# Patient Record
Sex: MALE | Race: WHITE | Employment: FULL TIME | URBAN - METROPOLITAN AREA
[De-identification: names, ages, dates, MRNs, and addresses within clinical notes are randomized per-mention and may not be internally consistent; named-entity substitution may affect disease eponyms.]

---

## 2021-04-11 ENCOUNTER — HOSPITAL ENCOUNTER (EMERGENCY)
Facility: HOSPITAL | Age: 26
Discharge: HOME | End: 2021-04-11
Attending: EMERGENCY MEDICINE
Payer: COMMERCIAL

## 2021-04-11 VITALS
RESPIRATION RATE: 20 BRPM | SYSTOLIC BLOOD PRESSURE: 128 MMHG | WEIGHT: 230 LBS | OXYGEN SATURATION: 96 % | DIASTOLIC BLOOD PRESSURE: 86 MMHG | HEART RATE: 98 BPM | HEIGHT: 67 IN | BODY MASS INDEX: 36.1 KG/M2 | TEMPERATURE: 98 F

## 2021-04-11 DIAGNOSIS — K29.00 ACUTE GASTRITIS WITHOUT HEMORRHAGE, UNSPECIFIED GASTRITIS TYPE: Primary | ICD-10-CM

## 2021-04-11 DIAGNOSIS — E11.65 TYPE 2 DIABETES MELLITUS WITH HYPERGLYCEMIA, WITHOUT LONG-TERM CURRENT USE OF INSULIN (HCC): ICD-10-CM

## 2021-04-11 PROCEDURE — 85025 COMPLETE CBC W/AUTO DIFF WBC: CPT | Performed by: EMERGENCY MEDICINE

## 2021-04-11 PROCEDURE — 80048 BASIC METABOLIC PNL TOTAL CA: CPT | Performed by: EMERGENCY MEDICINE

## 2021-04-11 PROCEDURE — 96374 THER/PROPH/DIAG INJ IV PUSH: CPT

## 2021-04-11 PROCEDURE — 96361 HYDRATE IV INFUSION ADD-ON: CPT

## 2021-04-11 PROCEDURE — 83690 ASSAY OF LIPASE: CPT | Performed by: EMERGENCY MEDICINE

## 2021-04-11 PROCEDURE — 80076 HEPATIC FUNCTION PANEL: CPT | Performed by: EMERGENCY MEDICINE

## 2021-04-11 PROCEDURE — 99284 EMERGENCY DEPT VISIT MOD MDM: CPT

## 2021-04-11 PROCEDURE — S0028 INJECTION, FAMOTIDINE, 20 MG: HCPCS | Performed by: EMERGENCY MEDICINE

## 2021-04-11 PROCEDURE — 96375 TX/PRO/DX INJ NEW DRUG ADDON: CPT

## 2021-04-11 RX ORDER — MORPHINE SULFATE 4 MG/ML
4 INJECTION, SOLUTION INTRAMUSCULAR; INTRAVENOUS ONCE
Status: COMPLETED | OUTPATIENT
Start: 2021-04-11 | End: 2021-04-11

## 2021-04-11 RX ORDER — ONDANSETRON 2 MG/ML
4 INJECTION INTRAMUSCULAR; INTRAVENOUS ONCE
Status: COMPLETED | OUTPATIENT
Start: 2021-04-11 | End: 2021-04-11

## 2021-04-11 RX ORDER — FAMOTIDINE 10 MG/ML
20 INJECTION, SOLUTION INTRAVENOUS ONCE
Status: COMPLETED | OUTPATIENT
Start: 2021-04-11 | End: 2021-04-11

## 2021-04-11 RX ORDER — FAMOTIDINE 20 MG/1
20 TABLET ORAL 2 TIMES DAILY PRN
Qty: 30 TABLET | Refills: 0 | Status: SHIPPED | OUTPATIENT
Start: 2021-04-11 | End: 2021-05-11

## 2021-04-12 NOTE — ED QUICK NOTES
Pt states yesterday has 3 pieces of pizza and 45 minutes later developed sharp epigastric pain. States felt bloated. Pt took antacid, Kaopectate, laxative and Gas-X. States not feeling any better. States now also having pain in RLQ.  States did have stools

## 2021-04-12 NOTE — ED PROVIDER NOTES
Patient Seen in: [facility] AND [facility] Emergency Department      History   Patient presents with:  Abdomen/Flank Pain    Stated Complaint: stomach pain with radiation to the lower right side    HPI/Subjective:   HPI    68-year-old male with past medical hist clear b/l  Nose: Nose normal.   Mouth/Throat: MMM, post OP clear with no exudates  Eyes: Conjunctivae and EOM are normal. PERRLA  Neck: Normal range of motion. Neck supple. No tracheal deviation present.    CV: s1s2+, RRR, no m/r/g, normal distal pulses  Pu BLUE   RAINBOW DRAW LAVENDER   RAINBOW DRAW LIGHT GREEN   RAINBOW DRAW GOLD                   MDM      Patient's work-up is relatively unremarkable. He does have elevated blood sugar at this time.   Patient states that he is on Metformin 1000 mg twice sera

## 2021-04-12 NOTE — ED INITIAL ASSESSMENT (HPI)
Pt c/o sharp epigastric pain radiating to the RLQ x1 day. Pt reports slight diarrhea, denies any urinary symptoms.

## 2021-10-28 ENCOUNTER — OFFICE VISIT (OUTPATIENT)
Dept: FAMILY MEDICINE CLINIC | Facility: CLINIC | Age: 26
End: 2021-10-28
Payer: COMMERCIAL

## 2021-10-28 VITALS
TEMPERATURE: 98 F | HEIGHT: 67 IN | DIASTOLIC BLOOD PRESSURE: 77 MMHG | BODY MASS INDEX: 39.08 KG/M2 | SYSTOLIC BLOOD PRESSURE: 120 MMHG | WEIGHT: 249 LBS | HEART RATE: 100 BPM

## 2021-10-28 DIAGNOSIS — E66.01 SEVERE OBESITY (BMI 35.0-35.9 WITH COMORBIDITY) (HCC): ICD-10-CM

## 2021-10-28 DIAGNOSIS — E11.65 UNCONTROLLED TYPE 2 DIABETES MELLITUS WITH HYPERGLYCEMIA, WITHOUT LONG-TERM CURRENT USE OF INSULIN (HCC): Primary | ICD-10-CM

## 2021-10-28 DIAGNOSIS — E78.2 ERUPTIVE XANTHOMA: ICD-10-CM

## 2021-10-28 DIAGNOSIS — K21.9 GASTROESOPHAGEAL REFLUX DISEASE, UNSPECIFIED WHETHER ESOPHAGITIS PRESENT: ICD-10-CM

## 2021-10-28 DIAGNOSIS — L73.0 ACNE NUCHAE KELOIDALIS: ICD-10-CM

## 2021-10-28 DIAGNOSIS — Z23 NEED FOR VACCINATION: ICD-10-CM

## 2021-10-28 DIAGNOSIS — E78.2 MIXED HYPERLIPIDEMIA: ICD-10-CM

## 2021-10-28 PROCEDURE — 90471 IMMUNIZATION ADMIN: CPT | Performed by: FAMILY MEDICINE

## 2021-10-28 PROCEDURE — 3008F BODY MASS INDEX DOCD: CPT | Performed by: FAMILY MEDICINE

## 2021-10-28 PROCEDURE — 3078F DIAST BP <80 MM HG: CPT | Performed by: FAMILY MEDICINE

## 2021-10-28 PROCEDURE — 90472 IMMUNIZATION ADMIN EACH ADD: CPT | Performed by: FAMILY MEDICINE

## 2021-10-28 PROCEDURE — 90715 TDAP VACCINE 7 YRS/> IM: CPT | Performed by: FAMILY MEDICINE

## 2021-10-28 PROCEDURE — 3074F SYST BP LT 130 MM HG: CPT | Performed by: FAMILY MEDICINE

## 2021-10-28 PROCEDURE — 99204 OFFICE O/P NEW MOD 45 MIN: CPT | Performed by: FAMILY MEDICINE

## 2021-10-28 RX ORDER — INSULIN LISPRO 100 [IU]/ML
12 INJECTION, SOLUTION INTRAVENOUS; SUBCUTANEOUS
COMMUNITY
Start: 2021-05-03 | End: 2021-10-28

## 2021-10-28 RX ORDER — CHLORAL HYDRATE 500 MG
2 CAPSULE ORAL 2 TIMES DAILY WITH MEALS
COMMUNITY
Start: 2021-05-03

## 2021-10-28 RX ORDER — PANTOPRAZOLE SODIUM 40 MG/1
40 TABLET, DELAYED RELEASE ORAL
Qty: 90 TABLET | Refills: 1 | Status: SHIPPED | OUTPATIENT
Start: 2021-10-28 | End: 2022-10-28

## 2021-10-28 RX ORDER — BLOOD SUGAR DIAGNOSTIC
STRIP MISCELLANEOUS
COMMUNITY
Start: 2021-05-07

## 2021-10-28 RX ORDER — INSULIN LISPRO 100 [IU]/ML
22 INJECTION, SOLUTION INTRAVENOUS; SUBCUTANEOUS
Qty: 1 EACH | Refills: 0 | COMMUNITY
Start: 2021-10-28 | End: 2021-10-31

## 2021-10-28 RX ORDER — EZETIMIBE 10 MG/1
10 TABLET ORAL DAILY
COMMUNITY
Start: 2021-05-03 | End: 2021-10-31

## 2021-10-28 RX ORDER — PRAVASTATIN SODIUM 20 MG
20 TABLET ORAL DAILY
COMMUNITY
Start: 2021-05-03 | End: 2021-10-31

## 2021-10-28 RX ORDER — FENOFIBRATE 160 MG/1
160 TABLET ORAL DAILY
COMMUNITY
Start: 2021-05-03 | End: 2021-10-31

## 2021-10-28 RX ORDER — PEN NEEDLE, DIABETIC 31 GX5/16"
NEEDLE, DISPOSABLE MISCELLANEOUS
COMMUNITY
Start: 2021-05-07

## 2021-10-28 RX ORDER — NIACIN 500 MG
1000 TABLET ORAL NIGHTLY
COMMUNITY
Start: 2021-05-04 | End: 2021-10-31

## 2021-10-28 NOTE — PROGRESS NOTES
Patient ID: Yelena Maldonado is a 27year old male. HPI  Patient presents with:  Derm Problem: x 1 month    This is a new pt to me. Another physician was following him but he states that he never saw the patient in the office.   The patient was only being Past Medical History:   Diagnosis Date   • Diabetes [facility])    • Essential hypertension    • Hyperlipidemia        Past Surgical History:   Procedure Laterality Date   • OTHER      Testicle Surgery   • OTHER SURGICAL HISTORY            Current Outpatient warm. Numerous flesh colored papules all over his back and upper lateral arms. Acne papules on the back of his neck that extend into his hairline. Psychiatry: Normal mood and affect. Lower legs: No edema of the legs bilaterally. Vitals reviewed. VACCINE (TDAP), >7 YEARS, IM USE  He defers the influenza injection. Referrals (if applicable)  Orders Placed This Encounter      ENDOCRINOLOGY - INTERNAL          Order Comments:              ALSO WORKS AT [address] [place] [address].           Referral Pr PM

## 2021-10-29 ENCOUNTER — LAB ENCOUNTER (OUTPATIENT)
Dept: LAB | Age: 26
End: 2021-10-29
Attending: FAMILY MEDICINE
Payer: COMMERCIAL

## 2021-10-29 DIAGNOSIS — E11.65 UNCONTROLLED TYPE 2 DIABETES MELLITUS WITH HYPERGLYCEMIA, WITHOUT LONG-TERM CURRENT USE OF INSULIN (HCC): ICD-10-CM

## 2021-10-29 DIAGNOSIS — E78.2 MIXED HYPERLIPIDEMIA: ICD-10-CM

## 2021-10-29 PROCEDURE — 82570 ASSAY OF URINE CREATININE: CPT

## 2021-10-29 PROCEDURE — 3046F HEMOGLOBIN A1C LEVEL >9.0%: CPT | Performed by: FAMILY MEDICINE

## 2021-10-29 PROCEDURE — 84443 ASSAY THYROID STIM HORMONE: CPT

## 2021-10-29 PROCEDURE — 85025 COMPLETE CBC W/AUTO DIFF WBC: CPT

## 2021-10-29 PROCEDURE — 80061 LIPID PANEL: CPT

## 2021-10-29 PROCEDURE — 36415 COLL VENOUS BLD VENIPUNCTURE: CPT

## 2021-10-29 PROCEDURE — 83036 HEMOGLOBIN GLYCOSYLATED A1C: CPT

## 2021-10-29 PROCEDURE — 3060F POS MICROALBUMINURIA REV: CPT | Performed by: FAMILY MEDICINE

## 2021-10-29 PROCEDURE — 82043 UR ALBUMIN QUANTITATIVE: CPT

## 2021-10-29 PROCEDURE — 83721 ASSAY OF BLOOD LIPOPROTEIN: CPT

## 2021-10-29 PROCEDURE — 80053 COMPREHEN METABOLIC PANEL: CPT

## 2021-11-15 ENCOUNTER — OFFICE VISIT (OUTPATIENT)
Dept: FAMILY MEDICINE CLINIC | Facility: CLINIC | Age: 26
End: 2021-11-15
Payer: COMMERCIAL

## 2021-11-15 VITALS
SYSTOLIC BLOOD PRESSURE: 128 MMHG | TEMPERATURE: 99 F | WEIGHT: 253.81 LBS | HEIGHT: 67 IN | BODY MASS INDEX: 39.84 KG/M2 | DIASTOLIC BLOOD PRESSURE: 86 MMHG | HEART RATE: 96 BPM

## 2021-11-15 DIAGNOSIS — L73.0 ACNE NUCHAE KELOIDALIS: ICD-10-CM

## 2021-11-15 DIAGNOSIS — E78.2 ERUPTIVE XANTHOMA: ICD-10-CM

## 2021-11-15 DIAGNOSIS — Z23 NEED FOR VACCINATION: ICD-10-CM

## 2021-11-15 DIAGNOSIS — Z79.4 UNCONTROLLED DIABETES MELLITUS WITH HYPERGLYCEMIA, WITH LONG-TERM CURRENT USE OF INSULIN (HCC): Primary | ICD-10-CM

## 2021-11-15 DIAGNOSIS — E11.65 UNCONTROLLED DIABETES MELLITUS WITH HYPERGLYCEMIA, WITH LONG-TERM CURRENT USE OF INSULIN (HCC): Primary | ICD-10-CM

## 2021-11-15 DIAGNOSIS — E78.2 MIXED HYPERLIPIDEMIA: ICD-10-CM

## 2021-11-15 DIAGNOSIS — N48.1 BALANITIS: ICD-10-CM

## 2021-11-15 PROCEDURE — 99214 OFFICE O/P EST MOD 30 MIN: CPT | Performed by: FAMILY MEDICINE

## 2021-11-15 PROCEDURE — 3079F DIAST BP 80-89 MM HG: CPT | Performed by: FAMILY MEDICINE

## 2021-11-15 PROCEDURE — 90471 IMMUNIZATION ADMIN: CPT | Performed by: FAMILY MEDICINE

## 2021-11-15 PROCEDURE — 3008F BODY MASS INDEX DOCD: CPT | Performed by: FAMILY MEDICINE

## 2021-11-15 PROCEDURE — 3074F SYST BP LT 130 MM HG: CPT | Performed by: FAMILY MEDICINE

## 2021-11-15 PROCEDURE — 90686 IIV4 VACC NO PRSV 0.5 ML IM: CPT | Performed by: FAMILY MEDICINE

## 2021-11-15 RX ORDER — NYSTATIN 100000 U/G
CREAM TOPICAL
Qty: 30 G | Refills: 0 | Status: SHIPPED | OUTPATIENT
Start: 2021-11-15

## 2021-11-15 NOTE — PROGRESS NOTES
Patient ID: Luevenia Nageotte is a 27year old male. HPI  Patient presents with:  Test Results: Elevated a1c and cholesterol     Last seen by me on 10/28/2021. Pt reports he is feeling well.  He states the bumps on his left arm are getting smaller and th GFRAA 166 10/29/2021    CA 9.0 10/29/2021    OSMOCALC 293 10/29/2021    ALKPHO 106 10/29/2021    AST 12 (L) 10/29/2021    ALT 29 10/29/2021    BILT 0.5 10/29/2021    TP 7.0 10/29/2021    ALB 3.6 10/29/2021    GLOBULIN 3.4 10/29/2021     10/29/2021 Medical History:      Past Medical History:   Diagnosis Date   • Diabetes [facility])    • Essential hypertension    • Hyperlipidemia        Past Surgical History:   Procedure Laterality Date   • OTHER      Testicle Surgery   • OTHER SURGICAL HISTORY well-nourished. No distress. Head: Normocephalic. Eyes: Conjunctivae and EOM are normal.   Cardiovascular: Normal rate, regular rhythm and normal heart sounds. Pulmonary/Chest: Effort normal and breath sounds normal. No respiratory distress.    Neurol presence of Roberto Carlos Campbell DO. Electronically Signed: Sonja Meadows, 11/15/2021, 1:52 PM.    ICalvin DO,  personally performed the services described in this documentation.  All medical record entries made by the scribe were at my direction

## 2021-11-19 ENCOUNTER — OFFICE VISIT (OUTPATIENT)
Dept: ENDOCRINOLOGY CLINIC | Facility: CLINIC | Age: 26
End: 2021-11-19
Payer: COMMERCIAL

## 2021-11-19 VITALS
HEART RATE: 103 BPM | BODY MASS INDEX: 40 KG/M2 | WEIGHT: 254 LBS | SYSTOLIC BLOOD PRESSURE: 137 MMHG | DIASTOLIC BLOOD PRESSURE: 76 MMHG

## 2021-11-19 DIAGNOSIS — E11.65 UNCONTROLLED TYPE 2 DIABETES MELLITUS WITH HYPERGLYCEMIA, WITHOUT LONG-TERM CURRENT USE OF INSULIN (HCC): Primary | ICD-10-CM

## 2021-11-19 PROCEDURE — 3078F DIAST BP <80 MM HG: CPT | Performed by: NURSE PRACTITIONER

## 2021-11-19 PROCEDURE — 3075F SYST BP GE 130 - 139MM HG: CPT | Performed by: NURSE PRACTITIONER

## 2021-11-19 PROCEDURE — 82947 ASSAY GLUCOSE BLOOD QUANT: CPT | Performed by: NURSE PRACTITIONER

## 2021-11-19 PROCEDURE — 36416 COLLJ CAPILLARY BLOOD SPEC: CPT | Performed by: NURSE PRACTITIONER

## 2021-11-19 PROCEDURE — 99204 OFFICE O/P NEW MOD 45 MIN: CPT | Performed by: NURSE PRACTITIONER

## 2021-11-19 RX ORDER — BLOOD-GLUCOSE SENSOR
EACH MISCELLANEOUS
Qty: 3 EACH | Refills: 2 | Status: SHIPPED | OUTPATIENT
Start: 2021-11-19

## 2021-11-19 RX ORDER — BLOOD-GLUCOSE,RECEIVER,CONT
1 EACH MISCELLANEOUS DAILY
Qty: 1 EACH | Refills: 0 | Status: SHIPPED | OUTPATIENT
Start: 2021-11-19

## 2021-11-19 RX ORDER — BLOOD-GLUCOSE TRANSMITTER
EACH MISCELLANEOUS
Qty: 1 EACH | Refills: 0 | Status: SHIPPED | OUTPATIENT
Start: 2021-11-19

## 2021-11-19 RX ORDER — SEMAGLUTIDE 1.34 MG/ML
0.25 INJECTION, SOLUTION SUBCUTANEOUS WEEKLY
Qty: 4.5 ML | Refills: 0 | Status: SHIPPED | OUTPATIENT
Start: 2021-11-19

## 2021-11-19 NOTE — PROGRESS NOTES
Name: Sandhya Faith  Date: 11/19/2021    Referring Physician: Jg Knox    CHIEF COMPLAINT   Patient presents with:  Consult: diabetes, diagnosed about 4 years ago, used to be on [facility]   Sandhya Faith is a 27year old male sweets   Drinks diet soda only   + ICE sparkling water   + sugar free redbull     EXERCISE:   Yes - twice weekly - 1hr walking     Polyuria, polyphagia, polydipsia: yes - polydipsia   Paresthesias: no   Blurred vision: no   Recent steroids, illness or infe total) by mouth daily. , Disp: 90 tablet, Rfl: 1  •  metFORMIN HCl 1000 MG Oral Tab, Take 1 tablet (1,000 mg total) by mouth 2 (two) times daily with meals. , Disp: 180 tablet, Rfl: 1  •  Fenofibrate 160 MG Oral Tab, Take 1 tablet (160 mg total) by mouth sania normal speech  Back: no kyphosis  Respiratory:  Speaking in full sentences, non-labored.  no increased work of breathing, no audible wheezing    Skin:  normal moisture and skin texture, no visible lesions  Hair and nails: normal scalp hair  Hematologic:  no bed at night.   -discussed option that patient schedules apt with CDE to review low carb diet, however he declines today.   -reviewed target goal BG readings and A1C  -reviewed when to call and notify me of abnormal BG readings.   -instructed to increase wa

## 2021-11-19 NOTE — PATIENT INSTRUCTIONS
A1C: 12.5% on 10/26/2021  Blood glucose: 236 in clinic today    Medications:   -continue with Metformin 1,000mg twice daily   -continue with Lantus 50 units once daily in AM  -continue with Humalog 25 units with breakfast      25 units with lunch     25 un

## 2021-11-26 ENCOUNTER — OFFICE VISIT (OUTPATIENT)
Dept: PODIATRY CLINIC | Facility: CLINIC | Age: 26
End: 2021-11-26
Payer: COMMERCIAL

## 2021-11-26 DIAGNOSIS — Z79.4 UNCONTROLLED DIABETES MELLITUS WITH HYPERGLYCEMIA, WITH LONG-TERM CURRENT USE OF INSULIN (HCC): Primary | ICD-10-CM

## 2021-11-26 DIAGNOSIS — E11.65 UNCONTROLLED DIABETES MELLITUS WITH HYPERGLYCEMIA, WITH LONG-TERM CURRENT USE OF INSULIN (HCC): Primary | ICD-10-CM

## 2021-11-26 PROCEDURE — 99203 OFFICE O/P NEW LOW 30 MIN: CPT | Performed by: PODIATRIST

## 2021-11-26 NOTE — PROGRESS NOTES
[facility], [facility] Podiatry  Progress Note    Rabia Wong is a 27year old male.  Patient presents with:  Diabetic Foot Care: Consult - last HmJ0G=21.5 from 10/29/21 and LOV with josiah Ovalle was 11/19/21 - has no c/o regarding his feet - this AM he did n tablet 1   • metFORMIN HCl 1000 MG Oral Tab Take 1 tablet (1,000 mg total) by mouth 2 (two) times daily with meals. 180 tablet 1   • Fenofibrate 160 MG Oral Tab Take 1 tablet (160 mg total) by mouth daily.  90 tablet 1   • niacin 500 MG Oral Tab Take 2 tabl bilateral  Temperature warm proximally to warm distally bilateral  Hemosiderin deposits absent bilateral  Integumentary Examination:   Digital hair growth is present left and is present right. Skin is of diminished texture and decreased turgor.   Neurologi year for diabetic foot exam    No follow-ups on file.     Darlene Mora DPM  11/26/2021

## 2021-12-09 ENCOUNTER — HOSPITAL ENCOUNTER (EMERGENCY)
Facility: HOSPITAL | Age: 26
Discharge: HOME | End: 2021-12-09
Attending: EMERGENCY MEDICINE
Payer: COMMERCIAL

## 2021-12-09 ENCOUNTER — APPOINTMENT (OUTPATIENT)
Dept: CT IMAGING | Facility: HOSPITAL | Age: 26
End: 2021-12-09
Attending: EMERGENCY MEDICINE
Payer: COMMERCIAL

## 2021-12-09 VITALS
RESPIRATION RATE: 21 BRPM | OXYGEN SATURATION: 94 % | TEMPERATURE: 100 F | SYSTOLIC BLOOD PRESSURE: 126 MMHG | HEART RATE: 114 BPM | DIASTOLIC BLOOD PRESSURE: 83 MMHG

## 2021-12-09 DIAGNOSIS — R11.2 NAUSEA AND VOMITING IN ADULT: Primary | ICD-10-CM

## 2021-12-09 PROCEDURE — 96375 TX/PRO/DX INJ NEW DRUG ADDON: CPT

## 2021-12-09 PROCEDURE — 82962 GLUCOSE BLOOD TEST: CPT

## 2021-12-09 PROCEDURE — 85060 BLOOD SMEAR INTERPRETATION: CPT | Performed by: EMERGENCY MEDICINE

## 2021-12-09 PROCEDURE — 99284 EMERGENCY DEPT VISIT MOD MDM: CPT

## 2021-12-09 PROCEDURE — 74177 CT ABD & PELVIS W/CONTRAST: CPT | Performed by: EMERGENCY MEDICINE

## 2021-12-09 PROCEDURE — 85025 COMPLETE CBC W/AUTO DIFF WBC: CPT | Performed by: EMERGENCY MEDICINE

## 2021-12-09 PROCEDURE — 83690 ASSAY OF LIPASE: CPT | Performed by: EMERGENCY MEDICINE

## 2021-12-09 PROCEDURE — 81001 URINALYSIS AUTO W/SCOPE: CPT | Performed by: EMERGENCY MEDICINE

## 2021-12-09 PROCEDURE — 96361 HYDRATE IV INFUSION ADD-ON: CPT

## 2021-12-09 PROCEDURE — 96374 THER/PROPH/DIAG INJ IV PUSH: CPT

## 2021-12-09 PROCEDURE — 80076 HEPATIC FUNCTION PANEL: CPT | Performed by: EMERGENCY MEDICINE

## 2021-12-09 PROCEDURE — 80048 BASIC METABOLIC PNL TOTAL CA: CPT | Performed by: EMERGENCY MEDICINE

## 2021-12-09 RX ORDER — MORPHINE SULFATE 4 MG/ML
4 INJECTION, SOLUTION INTRAMUSCULAR; INTRAVENOUS ONCE
Status: COMPLETED | OUTPATIENT
Start: 2021-12-09 | End: 2021-12-09

## 2021-12-09 RX ORDER — ONDANSETRON 2 MG/ML
4 INJECTION INTRAMUSCULAR; INTRAVENOUS ONCE
Status: COMPLETED | OUTPATIENT
Start: 2021-12-09 | End: 2021-12-09

## 2021-12-09 NOTE — ED QUICK NOTES
Pt provided and explained d/c instructions, at-home care, follow-up, and otc rx. Pt in nad at this time. Iv access d/c. Vss. Sagastume. A&ox3. Belongings with pt. To triage via wheelchair for comfort. All questions and concerns addressed.

## 2021-12-09 NOTE — ED PROVIDER NOTES
Patient Seen in: [facility] AND [facility] Emergency Department      History   Patient presents with:  Abdomen/Flank Pain    Stated Complaint: abdominal pain    Subjective:   HPI    Is a 68-year-old diabetic male who presents with vomiting and diarrhea that star in all extremities, no focal deficits  SKIN: warm, dry, no rashes        ED Course     Labs Reviewed   URINALYSIS WITH CULTURE REFLEX - Abnormal; Notable for the following components:       Result Value    Clarity Urine Hazy (*)     Glucose Urine >=500 (*) summaries, testing, and procedures and reviewed those reports. DDX:  Pancreatitis, gastritis, colitis, biliary colic, foodborne illness     PHYSICIAN NOTE:  Pt given IVF, zofran, morphine. Labs remarkable for leukocytosis only.  Pain improved on reasse

## 2021-12-13 ENCOUNTER — LAB ENCOUNTER (OUTPATIENT)
Dept: LAB | Age: 26
End: 2021-12-13
Attending: FAMILY MEDICINE
Payer: COMMERCIAL

## 2021-12-13 DIAGNOSIS — Z79.4 UNCONTROLLED DIABETES MELLITUS WITH HYPERGLYCEMIA, WITH LONG-TERM CURRENT USE OF INSULIN (HCC): ICD-10-CM

## 2021-12-13 DIAGNOSIS — E11.65 UNCONTROLLED DIABETES MELLITUS WITH HYPERGLYCEMIA, WITH LONG-TERM CURRENT USE OF INSULIN (HCC): ICD-10-CM

## 2021-12-13 DIAGNOSIS — E78.2 MIXED HYPERLIPIDEMIA: ICD-10-CM

## 2021-12-13 PROCEDURE — 83036 HEMOGLOBIN GLYCOSYLATED A1C: CPT

## 2021-12-13 PROCEDURE — 84460 ALANINE AMINO (ALT) (SGPT): CPT

## 2021-12-13 PROCEDURE — 80061 LIPID PANEL: CPT

## 2021-12-13 PROCEDURE — 80048 BASIC METABOLIC PNL TOTAL CA: CPT

## 2021-12-13 PROCEDURE — 84450 TRANSFERASE (AST) (SGOT): CPT

## 2022-02-25 ENCOUNTER — APPOINTMENT (OUTPATIENT)
Dept: CT IMAGING | Facility: HOSPITAL | Age: 27
DRG: 728 | End: 2022-02-25
Attending: EMERGENCY MEDICINE
Payer: COMMERCIAL

## 2022-02-25 ENCOUNTER — HOSPITAL ENCOUNTER (INPATIENT)
Facility: HOSPITAL | Age: 27
LOS: 5 days | Discharge: HOME | DRG: 728 | End: 2022-03-02
Attending: EMERGENCY MEDICINE | Admitting: EMERGENCY MEDICINE
Payer: COMMERCIAL

## 2022-02-25 DIAGNOSIS — L03.315 CELLULITIS, PERINEUM: Primary | ICD-10-CM

## 2022-02-25 PROBLEM — E87.6 HYPOKALEMIA: Status: ACTIVE | Noted: 2022-02-25

## 2022-02-25 PROBLEM — R73.9 HYPERGLYCEMIA: Status: ACTIVE | Noted: 2022-02-25

## 2022-02-25 LAB
ANION GAP SERPL CALC-SCNC: 9 MMOL/L (ref 0–18)
BASOPHILS # BLD AUTO: 0.03 X10(3) UL (ref 0–0.2)
BASOPHILS NFR BLD AUTO: 0.2 %
BUN BLD-MCNC: 10 MG/DL (ref 7–18)
BUN/CREAT SERPL: 15.4 (ref 10–20)
CALCIUM BLD-MCNC: 8.5 MG/DL (ref 8.5–10.1)
CHLORIDE SERPL-SCNC: 102 MMOL/L (ref 98–112)
CO2 SERPL-SCNC: 26 MMOL/L (ref 21–32)
CREAT BLD-MCNC: 0.65 MG/DL
DEPRECATED RDW RBC AUTO: 39.8 FL (ref 35.1–46.3)
EOSINOPHIL # BLD AUTO: 0.13 X10(3) UL (ref 0–0.7)
EOSINOPHIL NFR BLD AUTO: 1 %
ERYTHROCYTE [DISTWIDTH] IN BLOOD BY AUTOMATED COUNT: 13.4 % (ref 11–15)
GLUCOSE BLDC GLUCOMTR-MCNC: 278 MG/DL (ref 70–99)
GLUCOSE BLDC GLUCOMTR-MCNC: 315 MG/DL (ref 70–99)
HCT VFR BLD AUTO: 42.7 %
HGB BLD-MCNC: 14.3 G/DL
IMM GRANULOCYTES # BLD AUTO: 0.05 X10(3) UL (ref 0–1)
IMM GRANULOCYTES NFR BLD: 0.4 %
LACTATE SERPL-SCNC: 1.5 MMOL/L (ref 0.4–2)
LYMPHOCYTES # BLD AUTO: 2.51 X10(3) UL (ref 1–4)
LYMPHOCYTES NFR BLD AUTO: 19.1 %
MCH RBC QN AUTO: 27.3 PG (ref 26–34)
MCHC RBC AUTO-ENTMCNC: 33.5 G/DL (ref 31–37)
MCV RBC AUTO: 81.5 FL
MONOCYTES # BLD AUTO: 0.73 X10(3) UL (ref 0.1–1)
MONOCYTES NFR BLD AUTO: 5.6 %
NEUTROPHILS # BLD AUTO: 9.69 X10 (3) UL (ref 1.5–7.7)
NEUTROPHILS # BLD AUTO: 9.69 X10(3) UL (ref 1.5–7.7)
NEUTROPHILS NFR BLD AUTO: 73.7 %
OSMOLALITY SERPL CALC.SUM OF ELEC: 293 MOSM/KG (ref 275–295)
PLATELET # BLD AUTO: 179 10(3)UL (ref 150–450)
POTASSIUM SERPL-SCNC: 3.5 MMOL/L (ref 3.5–5.1)
RBC # BLD AUTO: 5.24 X10(6)UL
SARS-COV-2 RNA RESP QL NAA+PROBE: NOT DETECTED
SODIUM SERPL-SCNC: 137 MMOL/L (ref 136–145)
WBC # BLD AUTO: 13.1 X10(3) UL (ref 4–11)

## 2022-02-25 PROCEDURE — 74177 CT ABD & PELVIS W/CONTRAST: CPT | Performed by: EMERGENCY MEDICINE

## 2022-02-25 PROCEDURE — 99223 1ST HOSP IP/OBS HIGH 75: CPT | Performed by: HOSPITALIST

## 2022-02-25 RX ORDER — FENOFIBRATE 134 MG/1
134 CAPSULE ORAL
Refills: 1 | Status: DISCONTINUED | OUTPATIENT
Start: 2022-02-26 | End: 2022-03-02

## 2022-02-25 RX ORDER — HEPARIN SODIUM 5000 [USP'U]/ML
5000 INJECTION, SOLUTION INTRAVENOUS; SUBCUTANEOUS EVERY 8 HOURS SCHEDULED
Status: DISCONTINUED | OUTPATIENT
Start: 2022-02-25 | End: 2022-03-02

## 2022-02-25 RX ORDER — BISACODYL 10 MG
10 SUPPOSITORY, RECTAL RECTAL
Status: DISCONTINUED | OUTPATIENT
Start: 2022-02-25 | End: 2022-03-02

## 2022-02-25 RX ORDER — ONDANSETRON 2 MG/ML
4 INJECTION INTRAMUSCULAR; INTRAVENOUS EVERY 6 HOURS PRN
Status: DISCONTINUED | OUTPATIENT
Start: 2022-02-25 | End: 2022-03-02

## 2022-02-25 RX ORDER — DEXTROSE MONOHYDRATE 25 G/50ML
50 INJECTION, SOLUTION INTRAVENOUS
Status: DISCONTINUED | OUTPATIENT
Start: 2022-02-25 | End: 2022-03-02

## 2022-02-25 RX ORDER — SENNOSIDES 8.6 MG
17.2 TABLET ORAL NIGHTLY PRN
Status: DISCONTINUED | OUTPATIENT
Start: 2022-02-25 | End: 2022-03-02

## 2022-02-25 RX ORDER — POLYETHYLENE GLYCOL 3350 17 G/17G
17 POWDER, FOR SOLUTION ORAL DAILY PRN
Status: DISCONTINUED | OUTPATIENT
Start: 2022-02-25 | End: 2022-03-02

## 2022-02-25 RX ORDER — ROSUVASTATIN CALCIUM 20 MG/1
20 TABLET, COATED ORAL NIGHTLY
Status: DISCONTINUED | OUTPATIENT
Start: 2022-02-25 | End: 2022-03-02

## 2022-02-25 RX ORDER — NICOTINE POLACRILEX 4 MG
15 LOZENGE BUCCAL
Status: DISCONTINUED | OUTPATIENT
Start: 2022-02-25 | End: 2022-03-02

## 2022-02-25 RX ORDER — SODIUM PHOSPHATE, DIBASIC AND SODIUM PHOSPHATE, MONOBASIC 7; 19 G/133ML; G/133ML
1 ENEMA RECTAL ONCE AS NEEDED
Status: DISCONTINUED | OUTPATIENT
Start: 2022-02-25 | End: 2022-03-02

## 2022-02-25 RX ORDER — ACETAMINOPHEN 325 MG/1
650 TABLET ORAL EVERY 4 HOURS PRN
Status: DISCONTINUED | OUTPATIENT
Start: 2022-02-25 | End: 2022-03-02

## 2022-02-25 RX ORDER — EZETIMIBE 10 MG/1
10 TABLET ORAL DAILY
Status: DISCONTINUED | OUTPATIENT
Start: 2022-02-26 | End: 2022-03-02

## 2022-02-25 RX ORDER — ACETAMINOPHEN 325 MG/1
650 TABLET ORAL EVERY 6 HOURS PRN
Status: DISCONTINUED | OUTPATIENT
Start: 2022-02-25 | End: 2022-03-02

## 2022-02-25 RX ORDER — LISINOPRIL 5 MG/1
5 TABLET ORAL DAILY
Status: DISCONTINUED | OUTPATIENT
Start: 2022-02-26 | End: 2022-03-02

## 2022-02-25 RX ORDER — HYDROCODONE BITARTRATE AND ACETAMINOPHEN 5; 325 MG/1; MG/1
1 TABLET ORAL EVERY 4 HOURS PRN
Status: DISCONTINUED | OUTPATIENT
Start: 2022-02-25 | End: 2022-03-02

## 2022-02-25 RX ORDER — PANTOPRAZOLE SODIUM 40 MG/1
40 TABLET, DELAYED RELEASE ORAL
Status: DISCONTINUED | OUTPATIENT
Start: 2022-02-26 | End: 2022-03-02

## 2022-02-25 RX ORDER — PROCHLORPERAZINE EDISYLATE 5 MG/ML
5 INJECTION INTRAMUSCULAR; INTRAVENOUS EVERY 8 HOURS PRN
Status: DISCONTINUED | OUTPATIENT
Start: 2022-02-25 | End: 2022-03-02

## 2022-02-25 RX ORDER — HYDROCODONE BITARTRATE AND ACETAMINOPHEN 5; 325 MG/1; MG/1
2 TABLET ORAL EVERY 4 HOURS PRN
Status: DISCONTINUED | OUTPATIENT
Start: 2022-02-25 | End: 2022-03-02

## 2022-02-25 RX ORDER — NIACIN 500 MG
1000 TABLET ORAL NIGHTLY
Status: DISCONTINUED | OUTPATIENT
Start: 2022-02-25 | End: 2022-03-02

## 2022-02-25 RX ORDER — SODIUM CHLORIDE 9 MG/ML
INJECTION, SOLUTION INTRAVENOUS CONTINUOUS
Status: DISCONTINUED | OUTPATIENT
Start: 2022-02-25 | End: 2022-02-26

## 2022-02-25 RX ORDER — NICOTINE POLACRILEX 4 MG
30 LOZENGE BUCCAL
Status: DISCONTINUED | OUTPATIENT
Start: 2022-02-25 | End: 2022-03-02

## 2022-02-25 NOTE — PROGRESS NOTES
[facility] Pharmacy Note:  Renal Adjustment for piperacillin/tazobactam (Chet Leyden)    Aj Nichols is a 32year old patient who has been prescribed piperacillin/tazobactam (ZOSYN) 3.375 gm every once. The CrCl cannot be calculated (Patient's most recent lab result is older than the maximum 7 days allowed. ). The dose has been adjusted to piperacillin/tazobactam (ZOSYN) 4.5 gm every once per hospital renal dose adjustment protocol for treatment of cellulitis. Pharmacy will follow and adjust dose as warranted for additional renal function changes.     Thank you,    Akbar Winter, PharmD  2/25/2022  4:27 PM

## 2022-02-25 NOTE — ED INITIAL ASSESSMENT (HPI)
The patient reports one day of a painful lump between his right testicle and right leg that produced a small amount of yellowish discharge. The patient reports fatigue and fever at home today.

## 2022-02-26 LAB
ALBUMIN SERPL-MCNC: 3 G/DL (ref 3.4–5)
ALBUMIN/GLOB SERPL: 0.9 {RATIO} (ref 1–2)
ALP LIVER SERPL-CCNC: 78 U/L
ANION GAP SERPL CALC-SCNC: 5 MMOL/L (ref 0–18)
AST SERPL-CCNC: 11 U/L (ref 15–37)
BASOPHILS # BLD AUTO: 0.03 X10(3) UL (ref 0–0.2)
BASOPHILS NFR BLD AUTO: 0.3 %
BILIRUB SERPL-MCNC: 1 MG/DL (ref 0.1–2)
BUN BLD-MCNC: 10 MG/DL (ref 7–18)
BUN/CREAT SERPL: 17.9 (ref 10–20)
CALCIUM BLD-MCNC: 8.2 MG/DL (ref 8.5–10.1)
CHLORIDE SERPL-SCNC: 108 MMOL/L (ref 98–112)
CO2 SERPL-SCNC: 26 MMOL/L (ref 21–32)
CREAT BLD-MCNC: 0.56 MG/DL
DEPRECATED RDW RBC AUTO: 40.7 FL (ref 35.1–46.3)
EOSINOPHIL # BLD AUTO: 0.18 X10(3) UL (ref 0–0.7)
EOSINOPHIL NFR BLD AUTO: 1.8 %
ERYTHROCYTE [DISTWIDTH] IN BLOOD BY AUTOMATED COUNT: 13.4 % (ref 11–15)
GLOBULIN PLAS-MCNC: 3.5 G/DL (ref 2.8–4.4)
GLUCOSE BLD-MCNC: 221 MG/DL (ref 70–99)
GLUCOSE BLDC GLUCOMTR-MCNC: 223 MG/DL (ref 70–99)
GLUCOSE BLDC GLUCOMTR-MCNC: 245 MG/DL (ref 70–99)
GLUCOSE BLDC GLUCOMTR-MCNC: 261 MG/DL (ref 70–99)
HCT VFR BLD AUTO: 42.4 %
HGB BLD-MCNC: 14.1 G/DL
IMM GRANULOCYTES # BLD AUTO: 0.04 X10(3) UL (ref 0–1)
IMM GRANULOCYTES NFR BLD: 0.4 %
LYMPHOCYTES # BLD AUTO: 2.12 X10(3) UL (ref 1–4)
LYMPHOCYTES NFR BLD AUTO: 21 %
MAGNESIUM SERPL-MCNC: 1.7 MG/DL (ref 1.6–2.6)
MCH RBC QN AUTO: 27.5 PG (ref 26–34)
MCHC RBC AUTO-ENTMCNC: 33.3 G/DL (ref 31–37)
MCV RBC AUTO: 82.8 FL
MONOCYTES # BLD AUTO: 0.62 X10(3) UL (ref 0.1–1)
MONOCYTES NFR BLD AUTO: 6.1 %
NEUTROPHILS # BLD AUTO: 7.1 X10 (3) UL (ref 1.5–7.7)
NEUTROPHILS # BLD AUTO: 7.1 X10(3) UL (ref 1.5–7.7)
NEUTROPHILS NFR BLD AUTO: 70.4 %
OSMOLALITY SERPL CALC.SUM OF ELEC: 294 MOSM/KG (ref 275–295)
PLATELET # BLD AUTO: 168 10(3)UL (ref 150–450)
POTASSIUM SERPL-SCNC: 3.7 MMOL/L (ref 3.5–5.1)
PROT SERPL-MCNC: 6.5 G/DL (ref 6.4–8.2)
SODIUM SERPL-SCNC: 139 MMOL/L (ref 136–145)
WBC # BLD AUTO: 10.1 X10(3) UL (ref 4–11)

## 2022-02-26 PROCEDURE — 99233 SBSQ HOSP IP/OBS HIGH 50: CPT | Performed by: HOSPITALIST

## 2022-02-26 PROCEDURE — 3046F HEMOGLOBIN A1C LEVEL >9.0%: CPT | Performed by: FAMILY MEDICINE

## 2022-02-26 RX ORDER — MAGNESIUM OXIDE 400 MG (241.3 MG MAGNESIUM) TABLET
400 TABLET ONCE
Status: COMPLETED | OUTPATIENT
Start: 2022-02-26 | End: 2022-02-26

## 2022-02-26 NOTE — PLAN OF CARE
Problem: Patient Centered Care  Goal: Patient preferences are identified and integrated in the patient's plan of care  Description: Interventions:  - What would you like us to know as we care for you?  I had this 5 years ago on the left side  - Provide timely, complete, and accurate information to patient/family  - Incorporate patient and family knowledge, values, beliefs, and cultural backgrounds into the planning and delivery of care  - Encourage patient/family to participate in care and decision-making at the level they choose  - Honor patient and family perspectives and choices  Outcome: Progressing     Problem: Diabetes/Glucose Control  Goal: Glucose maintained within prescribed range  Description: INTERVENTIONS:  - Monitor Blood Glucose as ordered  - Assess for signs and symptoms of hyperglycemia and hypoglycemia  - Administer ordered medications to maintain glucose within target range  - Assess barriers to adequate nutritional intake and initiate nutrition consult as needed  - Instruct patient on self management of diabetes  Outcome: Progressing     Problem: Patient/Family Goals  Goal: Patient/Family Long Term Goal  Description: Patient's Long Term Goal: go home    Interventions:  - medications  - See additional Care Plan goals for specific interventions  Outcome: Progressing  Goal: Patient/Family Short Term Goal  Description: Patient's Short Term Goal: no scrotal pain    Interventions:   - medications  - See additional Care Plan goals for specific interventions  Outcome: Progressing     Problem: PAIN - ADULT  Goal: Verbalizes/displays adequate comfort level or patient's stated pain goal  Description: INTERVENTIONS:  - Encourage pt to monitor pain and request assistance  - Assess pain using appropriate pain scale  - Administer analgesics based on type and severity of pain and evaluate response  - Implement non-pharmacological measures as appropriate and evaluate response  - Consider cultural and social influences on pain and pain management  - Manage/alleviate anxiety  - Utilize distraction and/or relaxation techniques  - Monitor for opioid side effects  - Notify MD/LIP if interventions unsuccessful or patient reports new pain  - Anticipate increased pain with activity and pre-medicate as appropriate  Outcome: Progressing     Problem: Integumentary status not within defined limits  Goal: Pt's integumentary status will be adequate for discharge  Outcome: Progressing  Patient with some pain in scrotum. On IV antibiotic with no adverse reaction noted. IV fluids discontinued. Insulin adjusted. Magnesium covered per protocol. Able to verbalize needs, call light in reach, continue to monitor.

## 2022-02-26 NOTE — ED QUICK NOTES
Orders for admission, patient is aware of plan and ready to go upstairs. Any questions, please call ED RN Katie Livingston at extension [phone].      Patient Covid vaccination status: Unvaccinated     COVID Test Ordered in ED: None    COVID Suspicion at Admission: N/A    Running Infusions:      Mental Status/LOC at time of transport: AOx4    Other pertinent information:   CIWA score: N/A   NIH score:  N/A

## 2022-02-26 NOTE — PLAN OF CARE
Problem: Patient Centered Care  Goal: Patient preferences are identified and integrated in the patient's plan of care  Description: Interventions:  - What would you like us to know as we care for you?  History Left scrotal abc  Problem: Diabetes/Glucose Control  Goal: Glucose maintained within prescribed range  Description: INTERVENTIONS:  - Monitor Blood Glucose as ordered  - Assess for signs and symptoms of hyperglycemia and hypoglycemia  - Administer ordered medications to maintain glucose within target range  - Assess barriers to adequate nutritional intake and initiate nutrition consult as needed  - Instruct patient on self management of diabetes  Outcome: Progressing     Problem: Patient/Family Goals  Goal: Patient/Family Long Term Goal  Description: Patient's Long Term Goal: discharge home    Interventions:  -  - See additional Care Plan goals for specific interventions  Outcome: Progressing  Goal: Patient/Family Short Term Goal  Description: Patient's Short Term Goal: pain and swelling to go away  Problem: Patient/Family Goals    Interventions:  -   - See additional Care Plan goals for specific interventions  Outcome: Progressing       Problem: PAIN - ADULT  Goal: Verbalizes/displays adequate comfort level or patient's stated pain goal  Description: INTERVENTIONS:  - Encourage pt to monitor pain and request assistance  - Assess pain using appropriate pain scale  - Administer analgesics based on type and severity of pain and evaluate response  - Implement non-pharmacological measures as appropriate and evaluate response  - Consider cultural and social influences on pain and pain management  - Manage/alleviate anxiety  - Utilize distraction and/or relaxation techniques  - Monitor for opioid side effects  - Notify MD/LIP if interventions unsuccessful or patient reports new pain  - Anticipate increased pain with activity and pre-medicate as appropriate  Outcome: Progressing     Problem: Integumentary status not within defined limits  Goal: Pt's integumentary status will be adequate for discharge  Outcome: Progressing       Interventions:   -   - See additional Care Plan goals for specific interventions  Outcome: Progressing   ess in past  Problem: Diabetes/Glucose Control  Goal: Glucose maintained within prescribed range  Description: INTERVENTIONS:  - Monitor Blood Glucose as ordered  - Assess for signs and symptoms of hyperglycemia and hypoglycemia  - Administer ordered medications to maintain glucose within target range  - Assess barriers to adequate nutritional intake and initiate nutrition consult as needed  - Instruct patient on self management of diabetes  Outcome: Progressing     - Provide timely, complete, and accurate information to patient/family  - Incorporate patient and family knowledge, values, beliefs, and cultural backgrounds into the planning and delivery of care  - Encourage patient/family to participate in care and decision-making at the level they choose  - Honor patient and family perspectives and choices  Outcome: Progressing   IV antibiotics infusing, IV fluids infusing, norco po pain control, call light within reach.

## 2022-02-27 LAB
BASOPHILS # BLD AUTO: 0.02 X10(3) UL (ref 0–0.2)
BASOPHILS NFR BLD AUTO: 0.3 %
DEPRECATED RDW RBC AUTO: 40 FL (ref 35.1–46.3)
EOSINOPHIL # BLD AUTO: 0.2 X10(3) UL (ref 0–0.7)
EOSINOPHIL NFR BLD AUTO: 2.7 %
ERYTHROCYTE [DISTWIDTH] IN BLOOD BY AUTOMATED COUNT: 13.2 % (ref 11–15)
EST. AVERAGE GLUCOSE BLD GHB EST-MCNC: 266 MG/DL (ref 68–126)
GLUCOSE BLDC GLUCOMTR-MCNC: 207 MG/DL (ref 70–99)
GLUCOSE BLDC GLUCOMTR-MCNC: 214 MG/DL (ref 70–99)
GLUCOSE BLDC GLUCOMTR-MCNC: 265 MG/DL (ref 70–99)
GLUCOSE BLDC GLUCOMTR-MCNC: 283 MG/DL (ref 70–99)
HBA1C MFR BLD: 10.9 % (ref ?–5.7)
HCT VFR BLD AUTO: 42.6 %
HGB BLD-MCNC: 14 G/DL
IMM GRANULOCYTES # BLD AUTO: 0.03 X10(3) UL (ref 0–1)
IMM GRANULOCYTES NFR BLD: 0.4 %
LYMPHOCYTES # BLD AUTO: 1.77 X10(3) UL (ref 1–4)
LYMPHOCYTES NFR BLD AUTO: 23.8 %
MAGNESIUM SERPL-MCNC: 1.9 MG/DL (ref 1.6–2.6)
MCH RBC QN AUTO: 27.2 PG (ref 26–34)
MCHC RBC AUTO-ENTMCNC: 32.9 G/DL (ref 31–37)
MCV RBC AUTO: 82.9 FL
MONOCYTES # BLD AUTO: 0.44 X10(3) UL (ref 0.1–1)
MONOCYTES NFR BLD AUTO: 5.9 %
NEUTROPHILS # BLD AUTO: 4.98 X10 (3) UL (ref 1.5–7.7)
NEUTROPHILS # BLD AUTO: 4.98 X10(3) UL (ref 1.5–7.7)
NEUTROPHILS NFR BLD AUTO: 66.9 %
PLATELET # BLD AUTO: 170 10(3)UL (ref 150–450)
RBC # BLD AUTO: 5.14 X10(6)UL
WBC # BLD AUTO: 7.4 X10(3) UL (ref 4–11)

## 2022-02-27 PROCEDURE — 99233 SBSQ HOSP IP/OBS HIGH 50: CPT | Performed by: HOSPITALIST

## 2022-02-27 RX ORDER — VANCOMYCIN 2 GRAM/500 ML IN 0.9 % SODIUM CHLORIDE INTRAVENOUS
25 ONCE
Status: COMPLETED | OUTPATIENT
Start: 2022-02-27 | End: 2022-02-27

## 2022-02-27 RX ORDER — VANCOMYCIN HYDROCHLORIDE
15 EVERY 12 HOURS
Status: DISCONTINUED | OUTPATIENT
Start: 2022-02-27 | End: 2022-02-27

## 2022-02-27 NOTE — PLAN OF CARE
Problem: Patient Centered Care  Goal: Patient preferences are identified and integrated in the patient's plan of care  Description: Interventions:  - What would you like us to know as we care for you?  I had this 5 years ago on the left side  - Provide timely, complete, and accurate information to patient/family  - Incorporate patient and family knowledge, values, beliefs, and cultural backgrounds into the planning and delivery of care  - Encourage patient/family to participate in care and decision-making at the level they choose  - Honor patient and family perspectives and choices  Outcome: Progressing     Problem: Diabetes/Glucose Control  Goal: Glucose maintained within prescribed range  Description: INTERVENTIONS:  - Monitor Blood Glucose as ordered  - Assess for signs and symptoms of hyperglycemia and hypoglycemia  - Administer ordered medications to maintain glucose within target range  - Assess barriers to adequate nutritional intake and initiate nutrition consult as needed  - Instruct patient on self management of diabetes  Outcome: Progressing     Problem: Patient/Family Goals  Goal: Patient/Family Long Term Goal  Description: Patient's Long Term Goal: go home    Interventions:  - Monitor vital signs  - Monitor appropriate labs  - Monitor blood glucose levels  - Pain management as needed  - Administer medications per order  - Follow MD orders  - Diagnostics per order  - Update / inform patient and family on plan of care  - Discharge planning  - See additional Care Plan goals for specific interventions  Outcome: Progressing  Goal: Patient/Family Short Term Goal  Description: Patient's Short Term Goal: no scrotal pain    Interventions:   - Monitor vital signs  - Monitor appropriate labs  - Monitor blood glucose levels  - Pain management as needed  - Administer medications per order  - Follow MD orders  - Diagnostics per order  - Update / inform patient and family on plan of care  - See additional Care Plan goals for specific interventions  Outcome: Progressing     Problem: PAIN - ADULT  Goal: Verbalizes/displays adequate comfort level or patient's stated pain goal  Description: INTERVENTIONS:  - Encourage pt to monitor pain and request assistance  - Assess pain using appropriate pain scale  - Administer analgesics based on type and severity of pain and evaluate response  - Implement non-pharmacological measures as appropriate and evaluate response  - Consider cultural and social influences on pain and pain management  - Manage/alleviate anxiety  - Utilize distraction and/or relaxation techniques  - Monitor for opioid side effects  - Notify MD/LIP if interventions unsuccessful or patient reports new pain  - Anticipate increased pain with activity and pre-medicate as appropriate  Outcome: Progressing     Problem: Integumentary status not within defined limits  Goal: Pt's integumentary status will be adequate for discharge  Outcome: Progressing     Problem: SAFETY ADULT - FALL  Goal: Free from fall injury  Description: INTERVENTIONS:  - Assess pt frequently for physical needs  - Identify cognitive and physical deficits and behaviors that affect risk of falls.   - [place] fall precautions as indicated by assessment.  - Educate pt/family on patient safety including physical limitations  - Instruct pt to call for assistance with activity based on assessment  - Modify environment to reduce risk of injury  - Provide assistive devices as appropriate  - Consider OT/PT consult to assist with strengthening/mobility  - Encourage toileting schedule  Outcome: Progressing     Problem: DISCHARGE PLANNING  Goal: Discharge to home or other facility with appropriate resources  Description: INTERVENTIONS:  - Identify barriers to discharge w/pt and caregiver  - Include patient/family/discharge partner in discharge planning  - Arrange for needed discharge resources and transportation as appropriate  - Identify discharge learning needs (meds, wound care, etc)  - Arrange for interpreters to assist at discharge as needed  - Consider post-discharge preferences of patient/family/discharge partner  - Complete POLST form as appropriate  - Assess patient's ability to be responsible for managing their own health  - Refer to Case Management Department for coordinating discharge planning if the patient needs post-hospital services based on physician/LIP order or complex needs related to functional status, cognitive ability or social support system  Outcome: Progressing     Problem: SKIN/TISSUE INTEGRITY - ADULT  Goal: Skin integrity remains intact  Description: INTERVENTIONS  - Assess and document risk factors for pressure ulcer development  - Assess and document skin integrity  - Monitor for areas of redness and/or skin breakdown  - Initiate interventions, skin care algorithm/standards of care as needed  Outcome: Progressing      Monitoring vital signs- stable at this time. Monitoring blood glucose levels- insulin administered per order. Pain medication provided as needed. No acute changes noted at this time. Fiance at bedside. Safety and fall precautions maintained- I-bed awareness on, bed locked in lowest position, call light within reach. Frequent rounding by nursing staff.

## 2022-02-27 NOTE — PROGRESS NOTES
[facility] Dosing Service    Initial Pharmacokinetic Consult for Vancomycin AUC Dosing    Gale Resendiz is a 32year old patient who is being treated for cellulitis. Pharmacy has been asked to dose vancomycin by Galilea Carranza. Weights:  Ideal body weight: 66.1 kg (145 lb 11.6 oz)  Adjusted ideal body weight: 85.7 kg (189 lb 0.4 oz)  Actual weight:  115.2 kg (253 lb 15.5 oz)    Labs:      CrCl:  Estimated Creatinine Clearance: 182 mL/min (A) (based on SCr of 0.56 mg/dL (L)). Based on the above:    1. This patient will receive a loading dose of Vancomycin  2000 mg IVPB (25mg/kg, capped at 2000 mg) x 1 dose. This will be followed by 1250 mg Q 12 hours based upon adjusted body weight of 66.1 kg and renal function. 2. Vancomycin peak and trough will be obtained at steady state in order to calculate AUC24. Goal AUC24 is 400-600 mg-h/L.    3. Pharmacy will order SCr as clinically indicated while on vancomycin to assess renal function. 4. Pharmacy will follow and monitor renal function, toxicity and efficacy. We appreciate the opportunity to assist in the care of this patient.     Nasra Jones, PharmD  2/27/2022  8:34 AM  [facility]  Pharmacy Extension: [phone]

## 2022-02-28 LAB
BASOPHILS # BLD AUTO: 0.04 X10(3) UL (ref 0–0.2)
BASOPHILS NFR BLD AUTO: 0.5 %
DEPRECATED RDW RBC AUTO: 39.1 FL (ref 35.1–46.3)
EOSINOPHIL # BLD AUTO: 0.27 X10(3) UL (ref 0–0.7)
EOSINOPHIL NFR BLD AUTO: 3.5 %
ERYTHROCYTE [DISTWIDTH] IN BLOOD BY AUTOMATED COUNT: 13 % (ref 11–15)
GLUCOSE BLDC GLUCOMTR-MCNC: 196 MG/DL (ref 70–99)
GLUCOSE BLDC GLUCOMTR-MCNC: 242 MG/DL (ref 70–99)
GLUCOSE BLDC GLUCOMTR-MCNC: 252 MG/DL (ref 70–99)
GLUCOSE BLDC GLUCOMTR-MCNC: 267 MG/DL (ref 70–99)
HCT VFR BLD AUTO: 41.7 %
HGB BLD-MCNC: 13.8 G/DL
IMM GRANULOCYTES # BLD AUTO: 0.04 X10(3) UL (ref 0–1)
IMM GRANULOCYTES NFR BLD: 0.5 %
LYMPHOCYTES NFR BLD AUTO: 21.8 %
MCH RBC QN AUTO: 27.3 PG (ref 26–34)
MCHC RBC AUTO-ENTMCNC: 33.1 G/DL (ref 31–37)
MCV RBC AUTO: 82.6 FL
MONOCYTES # BLD AUTO: 0.44 X10(3) UL (ref 0.1–1)
MONOCYTES NFR BLD AUTO: 5.8 %
NEUTROPHILS # BLD AUTO: 5.16 X10 (3) UL (ref 1.5–7.7)
NEUTROPHILS # BLD AUTO: 5.16 X10(3) UL (ref 1.5–7.7)
NEUTROPHILS NFR BLD AUTO: 67.9 %
RBC # BLD AUTO: 5.05 X10(6)UL
WBC # BLD AUTO: 7.6 X10(3) UL (ref 4–11)

## 2022-02-28 PROCEDURE — 99233 SBSQ HOSP IP/OBS HIGH 50: CPT | Performed by: HOSPITALIST

## 2022-02-28 NOTE — PLAN OF CARE
Problem: Patient Centered Care  Goal: Patient preferences are identified and integrated in the patient's plan of care  Description: Interventions:  - What would you like us to know as we care for you?  I had this 5 years ago on the left side  - Provide timely, complete, and accurate information to patient/family  - Incorporate patient and family knowledge, values, beliefs, and cultural backgrounds into the planning and delivery of care  - Encourage patient/family to participate in care and decision-making at the level they choose  - Honor patient and family perspectives and choices  Outcome: Progressing     Problem: Diabetes/Glucose Control  Goal: Glucose maintained within prescribed range  Description: INTERVENTIONS:  - Monitor Blood Glucose as ordered  - Assess for signs and symptoms of hyperglycemia and hypoglycemia  - Administer ordered medications to maintain glucose within target range  - Assess barriers to adequate nutritional intake and initiate nutrition consult as needed  - Instruct patient on self management of diabetes  Outcome: Progressing     Problem: Patient/Family Goals  Goal: Patient/Family Long Term Goal  Description: Patient's Long Term Goal: go home    Interventions:  - Monitor vital signs  - Monitor appropriate labs  - Monitor blood glucose levels  - Pain management as needed  - Administer medications per order  - Follow MD orders  - Diagnostics per order  - Update / inform patient and family on plan of care  - Discharge planning  - See additional Care Plan goals for specific interventions  Outcome: Progressing  Goal: Patient/Family Short Term Goal  Description: Patient's Short Term Goal: no scrotal pain    Interventions:   - Monitor vital signs  - Monitor appropriate labs  - Monitor blood glucose levels  - Pain management as needed  - Administer medications per order  - Follow MD orders  - Diagnostics per order  - Update / inform patient and family on plan of care  - See additional Care Plan goals for specific interventions  Outcome: Progressing     Problem: PAIN - ADULT  Goal: Verbalizes/displays adequate comfort level or patient's stated pain goal  Description: INTERVENTIONS:  - Encourage pt to monitor pain and request assistance  - Assess pain using appropriate pain scale  - Administer analgesics based on type and severity of pain and evaluate response  - Implement non-pharmacological measures as appropriate and evaluate response  - Consider cultural and social influences on pain and pain management  - Manage/alleviate anxiety  - Utilize distraction and/or relaxation techniques  - Monitor for opioid side effects  - Notify MD/LIP if interventions unsuccessful or patient reports new pain  - Anticipate increased pain with activity and pre-medicate as appropriate  Outcome: Progressing     Problem: Integumentary status not within defined limits  Goal: Pt's integumentary status will be adequate for discharge  Outcome: Progressing     Problem: SAFETY ADULT - FALL  Goal: Free from fall injury  Description: INTERVENTIONS:  - Assess pt frequently for physical needs  - Identify cognitive and physical deficits and behaviors that affect risk of falls.   - [place] fall precautions as indicated by assessment.  - Educate pt/family on patient safety including physical limitations  - Instruct pt to call for assistance with activity based on assessment  - Modify environment to reduce risk of injury  - Provide assistive devices as appropriate  - Consider OT/PT consult to assist with strengthening/mobility  - Encourage toileting schedule  Outcome: Progressing     Problem: DISCHARGE PLANNING  Goal: Discharge to home or other facility with appropriate resources  Description: INTERVENTIONS:  - Identify barriers to discharge w/pt and caregiver  - Include patient/family/discharge partner in discharge planning  - Arrange for needed discharge resources and transportation as appropriate  - Identify discharge learning needs (meds, wound care, etc)  - Arrange for interpreters to assist at discharge as needed  - Consider post-discharge preferences of patient/family/discharge partner  - Complete POLST form as appropriate  - Assess patient's ability to be responsible for managing their own health  - Refer to Case Management Department for coordinating discharge planning if the patient needs post-hospital services based on physician/LIP order or complex needs related to functional status, cognitive ability or social support system  Outcome: Progressing     Problem: SKIN/TISSUE INTEGRITY - ADULT  Goal: Skin integrity remains intact  Description: INTERVENTIONS  - Assess and document risk factors for pressure ulcer development  - Assess and document skin integrity  - Monitor for areas of redness and/or skin breakdown  - Initiate interventions, skin care algorithm/standards of care as needed  Outcome: Progressing     Monitoring vital signs- stable at this time. Monitoring blood glucose levels- insulin administered per order. Pain medication provided as needed. No acute changes noted at this time. Fiance at bedside. Safety and fall precautions maintained- I-bed awareness on, bed locked in lowest position, call light within reach. Frequent rounding by nursing staff.

## 2022-02-28 NOTE — PLAN OF CARE
No acute changes. IV abx administered as ordered. Pt resting comfortably, call light within reach. Pt calls appropriately. Problem: Patient Centered Care  Goal: Patient preferences are identified and integrated in the patient's plan of care  Description: Interventions:  - What would you like us to know as we care for you?  I had this 5 years ago on the left side  - Provide timely, complete, and accurate information to patient/family  - Incorporate patient and family knowledge, values, beliefs, and cultural backgrounds into the planning and delivery of care  - Encourage patient/family to participate in care and decision-making at the level they choose  - Honor patient and family perspectives and choices  Outcome: Progressing     Problem: Diabetes/Glucose Control  Goal: Glucose maintained within prescribed range  Description: INTERVENTIONS:  - Monitor Blood Glucose as ordered  - Assess for signs and symptoms of hyperglycemia and hypoglycemia  - Administer ordered medications to maintain glucose within target range  - Assess barriers to adequate nutritional intake and initiate nutrition consult as needed  - Instruct patient on self management of diabetes  Outcome: Progressing     Problem: Patient/Family Goals  Goal: Patient/Family Long Term Goal  Description: Patient's Long Term Goal: go home    Interventions:  - Monitor vital signs  - Monitor appropriate labs  - Monitor blood glucose levels  - Pain management as needed  - Administer medications per order  - Follow MD orders  - Diagnostics per order  - Update / inform patient and family on plan of care  - Discharge planning  - See additional Care Plan goals for specific interventions  Outcome: Progressing  Goal: Patient/Family Short Term Goal  Description: Patient's Short Term Goal: no scrotal pain    Interventions:   - Monitor vital signs  - Monitor appropriate labs  - Monitor blood glucose levels  - Pain management as needed  - Administer medications per order  - Follow MD orders  - Diagnostics per order  - Update / inform patient and family on plan of care  - See additional Care Plan goals for specific interventions  Outcome: Progressing     Problem: PAIN - ADULT  Goal: Verbalizes/displays adequate comfort level or patient's stated pain goal  Description: INTERVENTIONS:  - Encourage pt to monitor pain and request assistance  - Assess pain using appropriate pain scale  - Administer analgesics based on type and severity of pain and evaluate response  - Implement non-pharmacological measures as appropriate and evaluate response  - Consider cultural and social influences on pain and pain management  - Manage/alleviate anxiety  - Utilize distraction and/or relaxation techniques  - Monitor for opioid side effects  - Notify MD/LIP if interventions unsuccessful or patient reports new pain  - Anticipate increased pain with activity and pre-medicate as appropriate  Outcome: Progressing     Problem: Integumentary status not within defined limits  Goal: Pt's integumentary status will be adequate for discharge  Outcome: Progressing     Problem: SAFETY ADULT - FALL  Goal: Free from fall injury  Description: INTERVENTIONS:  - Assess pt frequently for physical needs  - Identify cognitive and physical deficits and behaviors that affect risk of falls.   - [place] fall precautions as indicated by assessment.  - Educate pt/family on patient safety including physical limitations  - Instruct pt to call for assistance with activity based on assessment  - Modify environment to reduce risk of injury  - Provide assistive devices as appropriate  - Consider OT/PT consult to assist with strengthening/mobility  - Encourage toileting schedule  Outcome: Progressing     Problem: DISCHARGE PLANNING  Goal: Discharge to home or other facility with appropriate resources  Description: INTERVENTIONS:  - Identify barriers to discharge w/pt and caregiver  - Include patient/family/discharge partner in discharge planning  - Arrange for needed discharge resources and transportation as appropriate  - Identify discharge learning needs (meds, wound care, etc)  - Arrange for interpreters to assist at discharge as needed  - Consider post-discharge preferences of patient/family/discharge partner  - Complete POLST form as appropriate  - Assess patient's ability to be responsible for managing their own health  - Refer to Case Management Department for coordinating discharge planning if the patient needs post-hospital services based on physician/LIP order or complex needs related to functional status, cognitive ability or social support system  Outcome: Progressing     Problem: SKIN/TISSUE INTEGRITY - ADULT  Goal: Skin integrity remains intact  Description: INTERVENTIONS  - Assess and document risk factors for pressure ulcer development  - Assess and document skin integrity  - Monitor for areas of redness and/or skin breakdown  - Initiate interventions, skin care algorithm/standards of care as needed  Outcome: Progressing

## 2022-02-28 NOTE — CM/SW NOTE
02/28/22 1700   CM/SW Referral Data   Referral Source    Reason for Referral Discharge planning   Informant EMR;Clinical Staff Member   Pertinent Medical Hx   Does patient have an established PCP? Yes  (Harpal Haque)   Patient Info   Patient's Current Mental Status at Time of Assessment Alert;Oriented   Patient's Home Environment [facility]   Patient lives with Spouse/Significant other   Patient Status Prior to Admission   Independent with ADLs and Mobility Yes   Discharge Needs   Anticipated D/C needs No anticipated discharge needs     Pt discussed during nursing rounds. Dx cellulitis of perineum. From home w/fiancee. Independent and active prior to dx. No home care needs anticipated on dc. Plan: Home w/fiancee pending medical clearance. / to remain available for support and/or discharge planning.      BRANDY Valdovinos    [phone]

## 2022-02-28 NOTE — PLAN OF CARE
Acosta Rodriguez at bedside. Safety precautions in place and call light within reach. Problem: Patient Centered Care  Goal: Patient preferences are identified and integrated in the patient's plan of care  Description: Interventions:  - What would you like us to know as we care for you?  I had this 5 years ago on the left side  - Provide timely, complete, and accurate information to patient/family  - Incorporate patient and family knowledge, values, beliefs, and cultural backgrounds into the planning and delivery of care  - Encourage patient/family to participate in care and decision-making at the level they choose  - Honor patient and family perspectives and choices  Outcome: Progressing     Problem: Diabetes/Glucose Control  Goal: Glucose maintained within prescribed range  Description: INTERVENTIONS:  - Monitor Blood Glucose as ordered  - Assess for signs and symptoms of hyperglycemia and hypoglycemia  - Administer ordered medications to maintain glucose within target range  - Assess barriers to adequate nutritional intake and initiate nutrition consult as needed  - Instruct patient on self management of diabetes  Outcome: Progressing     Problem: Patient/Family Goals  Goal: Patient/Family Long Term Goal  Description: Patient's Long Term Goal: go home    Interventions:  - Monitor vital signs  - Monitor appropriate labs  - Monitor blood glucose levels  - Pain management as needed  - Administer medications per order  - Follow MD orders  - Diagnostics per order  - Update / inform patient and family on plan of care  - Discharge planning  - See additional Care Plan goals for specific interventions  Outcome: Progressing  Goal: Patient/Family Short Term Goal  Description: Patient's Short Term Goal: no scrotal pain    Interventions:   - Monitor vital signs  - Monitor appropriate labs  - Monitor blood glucose levels  - Pain management as needed  - Administer medications per order  - Follow MD orders  - Diagnostics per order  - Update / inform patient and family on plan of care  - See additional Care Plan goals for specific interventions  Outcome: Progressing     Problem: Integumentary status not within defined limits  Goal: Pt's integumentary status will be adequate for discharge  Outcome: Progressing     Problem: SAFETY ADULT - FALL  Goal: Free from fall injury  Description: INTERVENTIONS:  - Assess pt frequently for physical needs  - Identify cognitive and physical deficits and behaviors that affect risk of falls.   - [place] fall precautions as indicated by assessment.  - Educate pt/family on patient safety including physical limitations  - Instruct pt to call for assistance with activity based on assessment  - Modify environment to reduce risk of injury  - Provide assistive devices as appropriate  - Consider OT/PT consult to assist with strengthening/mobility  - Encourage toileting schedule  Outcome: Progressing     Problem: DISCHARGE PLANNING  Goal: Discharge to home or other facility with appropriate resources  Description: INTERVENTIONS:  - Identify barriers to discharge w/pt and caregiver  - Include patient/family/discharge partner in discharge planning  - Arrange for needed discharge resources and transportation as appropriate  - Identify discharge learning needs (meds, wound care, etc)  - Arrange for interpreters to assist at discharge as needed  - Consider post-discharge preferences of patient/family/discharge partner  - Complete POLST form as appropriate  - Assess patient's ability to be responsible for managing their own health  - Refer to Case Management Department for coordinating discharge planning if the patient needs post-hospital services based on physician/LIP order or complex needs related to functional status, cognitive ability or social support system  Outcome: Progressing     Problem: SKIN/TISSUE INTEGRITY - ADULT  Goal: Skin integrity remains intact  Description: INTERVENTIONS  - Assess and document risk factors for pressure ulcer development  - Assess and document skin integrity  - Monitor for areas of redness and/or skin breakdown  - Initiate interventions, skin care algorithm/standards of care as needed  Outcome: Progressing     Problem: PAIN - ADULT  Goal: Verbalizes/displays adequate comfort level or patient's stated pain goal  Description: INTERVENTIONS:  - Encourage pt to monitor pain and request assistance  - Assess pain using appropriate pain scale  - Administer analgesics based on type and severity of pain and evaluate response  - Implement non-pharmacological measures as appropriate and evaluate response  - Consider cultural and social influences on pain and pain management  - Manage/alleviate anxiety  - Utilize distraction and/or relaxation techniques  - Monitor for opioid side effects  - Notify MD/LIP if interventions unsuccessful or patient reports new pain  - Anticipate increased pain with activity and pre-medicate as appropriate  Outcome: Not Progressing

## 2022-03-01 ENCOUNTER — APPOINTMENT (OUTPATIENT)
Dept: CT IMAGING | Facility: HOSPITAL | Age: 27
DRG: 728 | End: 2022-03-01
Attending: HOSPITALIST
Payer: COMMERCIAL

## 2022-03-01 LAB
ANION GAP SERPL CALC-SCNC: 4 MMOL/L (ref 0–18)
BASOPHILS # BLD AUTO: 0.05 X10(3) UL (ref 0–0.2)
BASOPHILS NFR BLD AUTO: 0.6 %
BUN BLD-MCNC: 14 MG/DL (ref 7–18)
BUN/CREAT SERPL: 22.6 (ref 10–20)
CALCIUM BLD-MCNC: 8.9 MG/DL (ref 8.5–10.1)
CHLORIDE SERPL-SCNC: 107 MMOL/L (ref 98–112)
CO2 SERPL-SCNC: 28 MMOL/L (ref 21–32)
CREAT BLD-MCNC: 0.62 MG/DL
DEPRECATED RDW RBC AUTO: 39.4 FL (ref 35.1–46.3)
EOSINOPHIL # BLD AUTO: 0.29 X10(3) UL (ref 0–0.7)
EOSINOPHIL NFR BLD AUTO: 3.5 %
ERYTHROCYTE [DISTWIDTH] IN BLOOD BY AUTOMATED COUNT: 12.9 % (ref 11–15)
GLUCOSE BLD-MCNC: 265 MG/DL (ref 70–99)
GLUCOSE BLDC GLUCOMTR-MCNC: 227 MG/DL (ref 70–99)
GLUCOSE BLDC GLUCOMTR-MCNC: 269 MG/DL (ref 70–99)
GLUCOSE BLDC GLUCOMTR-MCNC: 272 MG/DL (ref 70–99)
GLUCOSE BLDC GLUCOMTR-MCNC: 277 MG/DL (ref 70–99)
HCT VFR BLD AUTO: 42.5 %
HGB BLD-MCNC: 14.1 G/DL
IMM GRANULOCYTES # BLD AUTO: 0.06 X10(3) UL (ref 0–1)
IMM GRANULOCYTES NFR BLD: 0.7 %
LYMPHOCYTES # BLD AUTO: 2.26 X10(3) UL (ref 1–4)
LYMPHOCYTES NFR BLD AUTO: 27.5 %
MCH RBC QN AUTO: 27.7 PG (ref 26–34)
MCHC RBC AUTO-ENTMCNC: 33.2 G/DL (ref 31–37)
MCV RBC AUTO: 83.5 FL
MONOCYTES # BLD AUTO: 0.43 X10(3) UL (ref 0.1–1)
MONOCYTES NFR BLD AUTO: 5.2 %
NEUTROPHILS # BLD AUTO: 5.12 X10 (3) UL (ref 1.5–7.7)
NEUTROPHILS # BLD AUTO: 5.12 X10(3) UL (ref 1.5–7.7)
NEUTROPHILS NFR BLD AUTO: 62.5 %
OSMOLALITY SERPL CALC.SUM OF ELEC: 298 MOSM/KG (ref 275–295)
PLATELET # BLD AUTO: 204 10(3)UL (ref 150–450)
POTASSIUM SERPL-SCNC: 4.3 MMOL/L (ref 3.5–5.1)
RBC # BLD AUTO: 5.09 X10(6)UL
SODIUM SERPL-SCNC: 139 MMOL/L (ref 136–145)
VANCOMYCIN PEAK SERPL-MCNC: 11.4 UG/ML (ref 30–50)
VANCOMYCIN TROUGH SERPL-MCNC: 1.9 UG/ML (ref 10–20)
WBC # BLD AUTO: 8.2 X10(3) UL (ref 4–11)

## 2022-03-01 PROCEDURE — 72193 CT PELVIS W/DYE: CPT | Performed by: HOSPITALIST

## 2022-03-01 PROCEDURE — 99233 SBSQ HOSP IP/OBS HIGH 50: CPT | Performed by: HOSPITALIST

## 2022-03-01 RX ORDER — VANCOMYCIN HYDROCHLORIDE
1.5 EVERY 8 HOURS
Status: DISCONTINUED | OUTPATIENT
Start: 2022-03-01 | End: 2022-03-02

## 2022-03-01 NOTE — PLAN OF CARE
Problem: Patient Centered Care  Goal: Patient preferences are identified and integrated in the patient's plan of care  Description: Interventions:  - What would you like us to know as we care for you?  I had this 5 years ago on the left side  - Provide timely, complete, and accurate information to patient/family  - Incorporate patient and family knowledge, values, beliefs, and cultural backgrounds into the planning and delivery of care  - Encourage patient/family to participate in care and decision-making at the level they choose  - Honor patient and family perspectives and choices  Outcome: Progressing     Problem: Diabetes/Glucose Control  Goal: Glucose maintained within prescribed range  Description: INTERVENTIONS:  - Monitor Blood Glucose as ordered  - Assess for signs and symptoms of hyperglycemia and hypoglycemia  - Administer ordered medications to maintain glucose within target range  - Assess barriers to adequate nutritional intake and initiate nutrition consult as needed  - Instruct patient on self management of diabetes  Outcome: Progressing     Problem: Patient/Family Goals  Goal: Patient/Family Long Term Goal  Description: Patient's Long Term Goal: go home    Interventions:  - Monitor vital signs  - Monitor appropriate labs  - Monitor blood glucose levels  - Pain management as needed  - Administer medications per order  - Follow MD orders  - Diagnostics per order  - Update / inform patient and family on plan of care  - Discharge planning  - See additional Care Plan goals for specific interventions  Outcome: Progressing  Goal: Patient/Family Short Term Goal  Description: Patient's Short Term Goal: no scrotal pain    Interventions:   - Monitor vital signs  - Monitor appropriate labs  - Monitor blood glucose levels  - Pain management as needed  - Administer medications per order  - Follow MD orders  - Diagnostics per order  - Update / inform patient and family on plan of care  - See additional Care Plan goals for specific interventions  Outcome: Progressing     Problem: PAIN - ADULT  Goal: Verbalizes/displays adequate comfort level or patient's stated pain goal  Description: INTERVENTIONS:  - Encourage pt to monitor pain and request assistance  - Assess pain using appropriate pain scale  - Administer analgesics based on type and severity of pain and evaluate response  - Implement non-pharmacological measures as appropriate and evaluate response  - Consider cultural and social influences on pain and pain management  - Manage/alleviate anxiety  - Utilize distraction and/or relaxation techniques  - Monitor for opioid side effects  - Notify MD/LIP if interventions unsuccessful or patient reports new pain  - Anticipate increased pain with activity and pre-medicate as appropriate  Outcome: Progressing     Problem: Integumentary status not within defined limits  Goal: Pt's integumentary status will be adequate for discharge  Outcome: Progressing     Problem: SAFETY ADULT - FALL  Goal: Free from fall injury  Description: INTERVENTIONS:  - Assess pt frequently for physical needs  - Identify cognitive and physical deficits and behaviors that affect risk of falls.   - [place] fall precautions as indicated by assessment.  - Educate pt/family on patient safety including physical limitations  - Instruct pt to call for assistance with activity based on assessment  - Modify environment to reduce risk of injury  - Provide assistive devices as appropriate  - Consider OT/PT consult to assist with strengthening/mobility  - Encourage toileting schedule  Outcome: Progressing     Problem: DISCHARGE PLANNING  Goal: Discharge to home or other facility with appropriate resources  Description: INTERVENTIONS:  - Identify barriers to discharge w/pt and caregiver  - Include patient/family/discharge partner in discharge planning  - Arrange for needed discharge resources and transportation as appropriate  - Identify discharge learning needs (meds, wound care, etc)  - Arrange for interpreters to assist at discharge as needed  - Consider post-discharge preferences of patient/family/discharge partner  - Complete POLST form as appropriate  - Assess patient's ability to be responsible for managing their own health  - Refer to Case Management Department for coordinating discharge planning if the patient needs post-hospital services based on physician/LIP order or complex needs related to functional status, cognitive ability or social support system  Outcome: Progressing     Problem: SKIN/TISSUE INTEGRITY - ADULT  Goal: Skin integrity remains intact  Description: INTERVENTIONS  - Assess and document risk factors for pressure ulcer development  - Assess and document skin integrity  - Monitor for areas of redness and/or skin breakdown  - Initiate interventions, skin care algorithm/standards of care as needed  Outcome: Progressing      Monitoring vital signs- stable at this time. Monitoring blood glucose levels- insulin administered per order. Pain medication provided as needed. No acute changes noted at this time. Fiance at bedside. Safety and fall precautions maintained- I-bed awareness on, bed locked in lowest position, call light within reach. Frequent rounding by nursing staff.

## 2022-03-01 NOTE — DIABETES ED
[facility]    Diabetes Education  Note    Aileen Dobbs Patient Status:  Inpatient   1995 MRN [id number]  Location [facility] 5SW/SE Attending Karyn Paulino MD  Hosp Day # 4 PCP Kae Calderon DO      Labs:    HgbA1C (%)   Date Value   2022 10.9 (H)         Reason for Visit: Elevated A1c value  Met with patient and significant other in room to discuss Diabetes management at home. Dicussed his current A1c value which he describes as an improvement from 3 months ago (>12%). Congratulated him on this improvement however reinforce the importance of blood sugar control. He reports his fiance has moved in with him and he is taking his medication a little more regularly. He said it is difficult to remember to take medication in the evening due to work schedule however he has made improvements. His fiance is helping him with meal planning. He also states he is following a correction scale for rapid acting insulin. He adminisnters 24 units of rapid acting insulin at home and additional units if blood sugar is elevated. Reinforced the importance of taking consistent carbohydrate intake with meals to help with blood sugar control. Patient educated regarding diabetes diet basics. Discussed carbohydrate foods, portion sizes, and food label reading. Handout given and initial meal plan provided. Encouraged to attend outpatient diabetes education. Questions answered. Receptive to information.         Lorenzo Joiner RN  Diabetes Educator  3/1/2022  9:39 AM

## 2022-03-01 NOTE — PLAN OF CARE
VSS, PO norco for pain. Scrotal pimple still intact, monitoring if opens. Repeat CT pelvis this morning. IV antibiotics. Will continue to monitor . Problem: Patient Centered Care  Goal: Patient preferences are identified and integrated in the patient's plan of care  Description: Interventions:  - What would you like us to know as we care for you?  I had this 5 years ago on the left side  - Provide timely, complete, and accurate information to patient/family  - Incorporate patient and family knowledge, values, beliefs, and cultural backgrounds into the planning and delivery of care  - Encourage patient/family to participate in care and decision-making at the level they choose  - Honor patient and family perspectives and choices  Outcome: Progressing     Problem: Diabetes/Glucose Control  Goal: Glucose maintained within prescribed range  Description: INTERVENTIONS:  - Monitor Blood Glucose as ordered  - Assess for signs and symptoms of hyperglycemia and hypoglycemia  - Administer ordered medications to maintain glucose within target range  - Assess barriers to adequate nutritional intake and initiate nutrition consult as needed  - Instruct patient on self management of diabetes  Outcome: Progressing     Problem: Patient/Family Goals  Goal: Patient/Family Long Term Goal  Description: Patient's Long Term Goal: go home    Interventions:  - Monitor vital signs  - Monitor appropriate labs  - Monitor blood glucose levels  - Pain management as needed  - Administer medications per order  - Follow MD orders  - Diagnostics per order  - Update / inform patient and family on plan of care  - Discharge planning  - See additional Care Plan goals for specific interventions  Outcome: Progressing  Goal: Patient/Family Short Term Goal  Description: Patient's Short Term Goal: no scrotal pain    Interventions:   - Monitor vital signs  - Monitor appropriate labs  - Monitor blood glucose levels  - Pain management as needed  - Administer medications per order  - Follow MD orders  - Diagnostics per order  - Update / inform patient and family on plan of care  - See additional Care Plan goals for specific interventions  Outcome: Progressing     Problem: PAIN - ADULT  Goal: Verbalizes/displays adequate comfort level or patient's stated pain goal  Description: INTERVENTIONS:  - Encourage pt to monitor pain and request assistance  - Assess pain using appropriate pain scale  - Administer analgesics based on type and severity of pain and evaluate response  - Implement non-pharmacological measures as appropriate and evaluate response  - Consider cultural and social influences on pain and pain management  - Manage/alleviate anxiety  - Utilize distraction and/or relaxation techniques  - Monitor for opioid side effects  - Notify MD/LIP if interventions unsuccessful or patient reports new pain  - Anticipate increased pain with activity and pre-medicate as appropriate  Outcome: Progressing     Problem: Integumentary status not within defined limits  Goal: Pt's integumentary status will be adequate for discharge  Outcome: Progressing     Problem: SAFETY ADULT - FALL  Goal: Free from fall injury  Description: INTERVENTIONS:  - Assess pt frequently for physical needs  - Identify cognitive and physical deficits and behaviors that affect risk of falls.   - [place] fall precautions as indicated by assessment.  - Educate pt/family on patient safety including physical limitations  - Instruct pt to call for assistance with activity based on assessment  - Modify environment to reduce risk of injury  - Provide assistive devices as appropriate  - Consider OT/PT consult to assist with strengthening/mobility  - Encourage toileting schedule  Outcome: Progressing     Problem: DISCHARGE PLANNING  Goal: Discharge to home or other facility with appropriate resources  Description: INTERVENTIONS:  - Identify barriers to discharge w/pt and caregiver  - Include patient/family/discharge partner in discharge planning  - Arrange for needed discharge resources and transportation as appropriate  - Identify discharge learning needs (meds, wound care, etc)  - Arrange for interpreters to assist at discharge as needed  - Consider post-discharge preferences of patient/family/discharge partner  - Complete POLST form as appropriate  - Assess patient's ability to be responsible for managing their own health  - Refer to Case Management Department for coordinating discharge planning if the patient needs post-hospital services based on physician/LIP order or complex needs related to functional status, cognitive ability or social support system  Outcome: Progressing     Problem: SKIN/TISSUE INTEGRITY - ADULT  Goal: Skin integrity remains intact  Description: INTERVENTIONS  - Assess and document risk factors for pressure ulcer development  - Assess and document skin integrity  - Monitor for areas of redness and/or skin breakdown  - Initiate interventions, skin care algorithm/standards of care as needed  Outcome: Progressing

## 2022-03-02 VITALS
HEART RATE: 97 BPM | TEMPERATURE: 98 F | OXYGEN SATURATION: 94 % | BODY MASS INDEX: 40 KG/M2 | WEIGHT: 254 LBS | RESPIRATION RATE: 20 BRPM | SYSTOLIC BLOOD PRESSURE: 123 MMHG | DIASTOLIC BLOOD PRESSURE: 64 MMHG

## 2022-03-02 LAB
BASOPHILS # BLD AUTO: 0.06 X10(3) UL (ref 0–0.2)
BASOPHILS NFR BLD AUTO: 0.8 %
CREAT BLD-MCNC: 0.53 MG/DL
DEPRECATED RDW RBC AUTO: 39.1 FL (ref 35.1–46.3)
EOSINOPHIL # BLD AUTO: 0.26 X10(3) UL (ref 0–0.7)
EOSINOPHIL NFR BLD AUTO: 3.5 %
ERYTHROCYTE [DISTWIDTH] IN BLOOD BY AUTOMATED COUNT: 13.1 % (ref 11–15)
GLUCOSE BLDC GLUCOMTR-MCNC: 177 MG/DL (ref 70–99)
GLUCOSE BLDC GLUCOMTR-MCNC: 185 MG/DL (ref 70–99)
HCT VFR BLD AUTO: 42.2 %
HGB BLD-MCNC: 13.9 G/DL
IMM GRANULOCYTES # BLD AUTO: 0.07 X10(3) UL (ref 0–1)
IMM GRANULOCYTES NFR BLD: 1 %
LYMPHOCYTES # BLD AUTO: 2.36 X10(3) UL (ref 1–4)
LYMPHOCYTES NFR BLD AUTO: 32.2 %
MCH RBC QN AUTO: 27.5 PG (ref 26–34)
MCHC RBC AUTO-ENTMCNC: 32.9 G/DL (ref 31–37)
MCV RBC AUTO: 83.4 FL
MONOCYTES # BLD AUTO: 0.46 X10(3) UL (ref 0.1–1)
MONOCYTES NFR BLD AUTO: 6.3 %
NEUTROPHILS # BLD AUTO: 4.13 X10 (3) UL (ref 1.5–7.7)
NEUTROPHILS # BLD AUTO: 4.13 X10(3) UL (ref 1.5–7.7)
NEUTROPHILS NFR BLD AUTO: 56.2 %
PLATELET # BLD AUTO: 213 10(3)UL (ref 150–450)
RBC # BLD AUTO: 5.06 X10(6)UL
WBC # BLD AUTO: 7.3 X10(3) UL (ref 4–11)

## 2022-03-02 PROCEDURE — 99239 HOSP IP/OBS DSCHRG MGMT >30: CPT | Performed by: HOSPITALIST

## 2022-03-02 RX ORDER — CIPROFLOXACIN 500 MG/1
500 TABLET, FILM COATED ORAL 2 TIMES DAILY
Qty: 14 TABLET | Refills: 0 | Status: SHIPPED | OUTPATIENT
Start: 2022-03-02 | End: 2022-03-09

## 2022-03-02 RX ORDER — METRONIDAZOLE 500 MG/1
500 TABLET ORAL 2 TIMES DAILY
Qty: 14 TABLET | Refills: 0 | Status: SHIPPED | OUTPATIENT
Start: 2022-03-02 | End: 2022-03-09

## 2022-03-02 RX ORDER — HYDROCODONE BITARTRATE AND ACETAMINOPHEN 5; 325 MG/1; MG/1
1-2 TABLET ORAL EVERY 6 HOURS PRN
Qty: 10 TABLET | Refills: 0 | Status: SHIPPED | OUTPATIENT
Start: 2022-03-02

## 2022-03-02 NOTE — PLAN OF CARE
Problem: Patient Centered Care  Goal: Patient preferences are identified and integrated in the patient's plan of care  Description: Interventions:  - What would you like us to know as we care for you?  I had this 5 years ago on the left side  - Provide timely, complete, and accurate information to patient/family  - Incorporate patient and family knowledge, values, beliefs, and cultural backgrounds into the planning and delivery of care  - Encourage patient/family to participate in care and decision-making at the level they choose  - Honor patient and family perspectives and choices  Outcome: Progressing     Problem: Patient/Family Goals  Goal: Patient/Family Long Term Goal  Description: Patient's Long Term Goal: go home    Interventions:  - Monitor vital signs  - Monitor appropriate labs  - Monitor blood glucose levels  - Pain management as needed  - Administer medications per order  - Follow MD orders  - Diagnostics per order  - Update / inform patient and family on plan of care  - Discharge planning  - See additional Care Plan goals for specific interventions  Outcome: Progressing  Goal: Patient/Family Short Term Goal  Description: Patient's Short Term Goal: no scrotal pain    Interventions:   - Monitor vital signs  - Monitor appropriate labs  - Monitor blood glucose levels  - Pain management as needed  - Administer medications per order  - Follow MD orders  - Diagnostics per order  - Update / inform patient and family on plan of care  - See additional Care Plan goals for specific interventions  Outcome: Progressing     Problem: PAIN - ADULT  Goal: Verbalizes/displays adequate comfort level or patient's stated pain goal  Description: INTERVENTIONS:  - Encourage pt to monitor pain and request assistance  - Assess pain using appropriate pain scale  - Administer analgesics based on type and severity of pain and evaluate response  - Implement non-pharmacological measures as appropriate and evaluate response  - Consider cultural and social influences on pain and pain management  - Manage/alleviate anxiety  - Utilize distraction and/or relaxation techniques  - Monitor for opioid side effects  - Notify MD/LIP if interventions unsuccessful or patient reports new pain  - Anticipate increased pain with activity and pre-medicate as appropriate  Outcome: Progressing     Problem: Integumentary status not within defined limits  Goal: Pt's integumentary status will be adequate for discharge  Outcome: Progressing     Problem: SAFETY ADULT - FALL  Goal: Free from fall injury  Description: INTERVENTIONS:  - Assess pt frequently for physical needs  - Identify cognitive and physical deficits and behaviors that affect risk of falls.   - [place] fall precautions as indicated by assessment.  - Educate pt/family on patient safety including physical limitations  - Instruct pt to call for assistance with activity based on assessment  - Modify environment to reduce risk of injury  - Provide assistive devices as appropriate  - Consider OT/PT consult to assist with strengthening/mobility  - Encourage toileting schedule  Outcome: Progressing     Problem: DISCHARGE PLANNING  Goal: Discharge to home or other facility with appropriate resources  Description: INTERVENTIONS:  - Identify barriers to discharge w/pt and caregiver  - Include patient/family/discharge partner in discharge planning  - Arrange for needed discharge resources and transportation as appropriate  - Identify discharge learning needs (meds, wound care, etc)  - Arrange for interpreters to assist at discharge as needed  - Consider post-discharge preferences of patient/family/discharge partner  - Complete POLST form as appropriate  - Assess patient's ability to be responsible for managing their own health  - Refer to Case Management Department for coordinating discharge planning if the patient needs post-hospital services based on physician/LIP order or complex needs related to functional status, cognitive ability or social support system  Outcome: Progressing     Problem: SKIN/TISSUE INTEGRITY - ADULT  Goal: Skin integrity remains intact  Description: INTERVENTIONS  - Assess and document risk factors for pressure ulcer development  - Assess and document skin integrity  - Monitor for areas of redness and/or skin breakdown  - Initiate interventions, skin care algorithm/standards of care as needed  Outcome: Progressing     Problem: Diabetes/Glucose Control  Goal: Glucose maintained within prescribed range  Description: INTERVENTIONS:  - Monitor Blood Glucose as ordered  - Assess for signs and symptoms of hyperglycemia and hypoglycemia  - Administer ordered medications to maintain glucose within target range  - Assess barriers to adequate nutritional intake and initiate nutrition consult as needed  - Instruct patient on self management of diabetes  Outcome: Not Progressing     Patient alert, vitals stable, medications tolerated well. Prn pain medication given for pain control. Blood sugar monitored. Iv antibiotics administered with no averse reactions noted. Patient able to make needs known. Fiance at bedside. Call light within reach.

## 2022-03-02 NOTE — PLAN OF CARE
Problem: Patient Centered Care  Goal: Patient preferences are identified and integrated in the patient's plan of care  Description: Interventions:  - What would you like us to know as we care for you?  I had this 5 years ago on the left side  - Provide timely, complete, and accurate information to patient/family  - Incorporate patient and family knowledge, values, beliefs, and cultural backgrounds into the planning and delivery of care  - Encourage patient/family to participate in care and decision-making at the level they choose  - Honor patient and family perspectives and choices  Outcome: Adequate for Discharge     Problem: Diabetes/Glucose Control  Goal: Glucose maintained within prescribed range  Description: INTERVENTIONS:  - Monitor Blood Glucose as ordered  - Assess for signs and symptoms of hyperglycemia and hypoglycemia  - Administer ordered medications to maintain glucose within target range  - Assess barriers to adequate nutritional intake and initiate nutrition consult as needed  - Instruct patient on self management of diabetes  Outcome: Adequate for Discharge     Problem: Patient/Family Goals  Goal: Patient/Family Long Term Goal  Description: Patient's Long Term Goal: go home    Interventions:  - Monitor vital signs  - Monitor appropriate labs  - Monitor blood glucose levels  - Pain management as needed  - Administer medications per order  - Follow MD orders  - Diagnostics per order  - Update / inform patient and family on plan of care  - Discharge planning  - See additional Care Plan goals for specific interventions  Outcome: Adequate for Discharge  Goal: Patient/Family Short Term Goal  Description: Patient's Short Term Goal: no scrotal pain    Interventions:   - Monitor vital signs  - Monitor appropriate labs  - Monitor blood glucose levels  - Pain management as needed  - Administer medications per order  - Follow MD orders  - Diagnostics per order  - Update / inform patient and family on plan of care  - See additional Care Plan goals for specific interventions  Outcome: Adequate for Discharge     Problem: PAIN - ADULT  Goal: Verbalizes/displays adequate comfort level or patient's stated pain goal  Description: INTERVENTIONS:  - Encourage pt to monitor pain and request assistance  - Assess pain using appropriate pain scale  - Administer analgesics based on type and severity of pain and evaluate response  - Implement non-pharmacological measures as appropriate and evaluate response  - Consider cultural and social influences on pain and pain management  - Manage/alleviate anxiety  - Utilize distraction and/or relaxation techniques  - Monitor for opioid side effects  - Notify MD/LIP if interventions unsuccessful or patient reports new pain  - Anticipate increased pain with activity and pre-medicate as appropriate  Outcome: Adequate for Discharge     Problem: Integumentary status not within defined limits  Goal: Pt's integumentary status will be adequate for discharge  Outcome: Adequate for Discharge     Problem: SAFETY ADULT - FALL  Goal: Free from fall injury  Description: INTERVENTIONS:  - Assess pt frequently for physical needs  - Identify cognitive and physical deficits and behaviors that affect risk of falls.   - [place] fall precautions as indicated by assessment.  - Educate pt/family on patient safety including physical limitations  - Instruct pt to call for assistance with activity based on assessment  - Modify environment to reduce risk of injury  - Provide assistive devices as appropriate  - Consider OT/PT consult to assist with strengthening/mobility  - Encourage toileting schedule  Outcome: Adequate for Discharge     Problem: DISCHARGE PLANNING  Goal: Discharge to home or other facility with appropriate resources  Description: INTERVENTIONS:  - Identify barriers to discharge w/pt and caregiver  - Include patient/family/discharge partner in discharge planning  - Arrange for needed discharge resources and transportation as appropriate  - Identify discharge learning needs (meds, wound care, etc)  - Arrange for interpreters to assist at discharge as needed  - Consider post-discharge preferences of patient/family/discharge partner  - Complete POLST form as appropriate  - Assess patient's ability to be responsible for managing their own health  - Refer to Case Management Department for coordinating discharge planning if the patient needs post-hospital services based on physician/LIP order or complex needs related to functional status, cognitive ability or social support system  Outcome: Adequate for Discharge     Problem: SKIN/TISSUE INTEGRITY - ADULT  Goal: Skin integrity remains intact  Description: INTERVENTIONS  - Assess and document risk factors for pressure ulcer development  - Assess and document skin integrity  - Monitor for areas of redness and/or skin breakdown  - Initiate interventions, skin care algorithm/standards of care as needed  Outcome: Adequate for Discharge   Pt okay to be discharged per MD order, all discharge instructions discussed with pt and all questions answered, peripheral IV removed, pt assisted to main elevator by this RN, pt refused need for wheelchair, picked up by family, stable at time of discharge.

## 2022-03-02 NOTE — CM/SW NOTE
03/02/22 1200   Discharge disposition   Expected discharge disposition Home or Self   Discharge transportation Private car     Pt discussed during nursing rounds. Pt is stable for dc today. MD dc order entered. No home care needs identified on dc. Pt's family will provide transport at dc. Plan: Home today. / to remain available for support and/or discharge planning.      BRANDY Mason    [phone]

## 2022-03-03 ENCOUNTER — PATIENT OUTREACH (OUTPATIENT)
Dept: CASE MANAGEMENT | Age: 27
End: 2022-03-03

## 2022-03-03 NOTE — PROGRESS NOTES
Attempted to reach the patient to complete a [facility]-Hospital FU call. Left a message for the pt to call the [facility] back at, [phone].

## 2022-03-04 NOTE — PROGRESS NOTES
Attempted to reach the patient to complete a [facility]-Hospital FU call. Left a message for the pt to call the NCM back at, [phone].

## 2022-04-16 NOTE — TELEPHONE ENCOUNTER
Please review. No protocol.   Requested Prescriptions   Pending Prescriptions Disp Refills    NIACIN 500 MG Oral Tab [Pharmacy Med Name: Niacin 500 MG Oral Tablet] 180 tablet 0     Sig: TAKE 2 TABLETS BY MOUTH NIGHTLY        There is no refill protocol information for this order           Recent Outpatient Visits              4 months ago Uncontrolled diabetes mellitus with hyperglycemia, with long-term current use of insulin [facility])    [address] [place], [place], [place]    Office Visit    4 months ago Uncontrolled type 2 diabetes mellitus with hyperglycemia, without long-term current use of insulin [facility])    [address] [facility] [place], APRN    Office Visit    5 months ago Uncontrolled diabetes mellitus with hyperglycemia, with long-term current use of insulin [facility])    [address], [address], [facility]    Office Visit    5 months ago Uncontrolled type 2 diabetes mellitus with hyperglycemia, without long-term current use of insulin [facility])    [address], [address], [place], [place]    Office Visit

## 2022-04-21 RX ORDER — NYSTATIN 100000 U/G
CREAM TOPICAL
Qty: 30 G | Refills: 0 | Status: SHIPPED | OUTPATIENT
Start: 2022-04-21

## 2022-04-21 RX ORDER — NIACIN 500 MG
1000 TABLET ORAL NIGHTLY
Qty: 180 TABLET | Refills: 0 | Status: SHIPPED | OUTPATIENT
Start: 2022-04-21

## 2022-08-26 ENCOUNTER — HOSPITAL ENCOUNTER (EMERGENCY)
Facility: HOSPITAL | Age: 27
Discharge: HOME | End: 2022-08-26
Attending: EMERGENCY MEDICINE
Payer: COMMERCIAL

## 2022-08-26 ENCOUNTER — APPOINTMENT (OUTPATIENT)
Dept: CT IMAGING | Facility: HOSPITAL | Age: 27
End: 2022-08-26
Attending: EMERGENCY MEDICINE
Payer: COMMERCIAL

## 2022-08-26 VITALS
DIASTOLIC BLOOD PRESSURE: 88 MMHG | HEIGHT: 67 IN | WEIGHT: 260 LBS | HEART RATE: 82 BPM | SYSTOLIC BLOOD PRESSURE: 130 MMHG | RESPIRATION RATE: 16 BRPM | TEMPERATURE: 98 F | BODY MASS INDEX: 40.81 KG/M2 | OXYGEN SATURATION: 98 %

## 2022-08-26 DIAGNOSIS — R51.9 ACUTE NONINTRACTABLE HEADACHE, UNSPECIFIED HEADACHE TYPE: Primary | ICD-10-CM

## 2022-08-26 PROCEDURE — 96374 THER/PROPH/DIAG INJ IV PUSH: CPT

## 2022-08-26 PROCEDURE — 96375 TX/PRO/DX INJ NEW DRUG ADDON: CPT

## 2022-08-26 PROCEDURE — 96361 HYDRATE IV INFUSION ADD-ON: CPT

## 2022-08-26 PROCEDURE — 99284 EMERGENCY DEPT VISIT MOD MDM: CPT

## 2022-08-26 PROCEDURE — 70450 CT HEAD/BRAIN W/O DYE: CPT | Performed by: EMERGENCY MEDICINE

## 2022-08-26 RX ORDER — PROCHLORPERAZINE EDISYLATE 5 MG/ML
10 INJECTION INTRAMUSCULAR; INTRAVENOUS ONCE
Status: COMPLETED | OUTPATIENT
Start: 2022-08-26 | End: 2022-08-26

## 2022-08-26 RX ORDER — DIPHENHYDRAMINE HYDROCHLORIDE 50 MG/ML
25 INJECTION INTRAMUSCULAR; INTRAVENOUS ONCE
Status: COMPLETED | OUTPATIENT
Start: 2022-08-26 | End: 2022-08-26

## 2022-08-26 NOTE — ED INITIAL ASSESSMENT (HPI)
Patient is here with a sharp pain on the top of his head x1 week. Patient denies any history of migraines. Denies dizziness, blurry vision, nausea or vomiting. Denies trauma. States taking Tylenol with no relief.

## 2022-08-26 NOTE — ED QUICK NOTES
Pt ambulatory to room brisk steady gait with c/o of right side headache x 1 week pt denies dizziness, changes in vision, nausea, vomiting, cp, sob, chills,fever,weakness, pt A&OX3 non labored breathing speaks clear full sentences, pt oriented to room, call light within reach, pt accompanied by family/ no distress noted.

## 2022-08-26 NOTE — ED QUICK NOTES
Patient denies sore throat or cough. Per wife patient has \"uncontrolled diabetes-takes meds on and off. Tries to control with it with diet\".

## 2022-08-27 ENCOUNTER — OFFICE VISIT (OUTPATIENT)
Dept: FAMILY MEDICINE CLINIC | Facility: CLINIC | Age: 27
End: 2022-08-27

## 2022-08-27 VITALS
DIASTOLIC BLOOD PRESSURE: 88 MMHG | BODY MASS INDEX: 40.18 KG/M2 | WEIGHT: 256 LBS | HEIGHT: 67 IN | SYSTOLIC BLOOD PRESSURE: 138 MMHG | HEART RATE: 91 BPM | TEMPERATURE: 97 F

## 2022-08-27 DIAGNOSIS — G44.209 TENSION HEADACHE: Primary | ICD-10-CM

## 2022-08-27 PROCEDURE — 99214 OFFICE O/P EST MOD 30 MIN: CPT | Performed by: FAMILY MEDICINE

## 2022-08-27 RX ORDER — MELOXICAM 15 MG/1
15 TABLET ORAL DAILY
Qty: 30 TABLET | Refills: 0 | Status: SHIPPED | OUTPATIENT
Start: 2022-08-27 | End: 2022-09-26

## 2022-08-27 RX ORDER — CYCLOBENZAPRINE HCL 10 MG
10 TABLET ORAL NIGHTLY
Qty: 30 TABLET | Refills: 0 | Status: SHIPPED | OUTPATIENT
Start: 2022-08-27 | End: 2022-09-26

## 2022-10-24 ENCOUNTER — LAB ENCOUNTER (OUTPATIENT)
Dept: LAB | Age: 27
End: 2022-10-24
Attending: FAMILY MEDICINE
Payer: COMMERCIAL

## 2022-10-24 ENCOUNTER — OFFICE VISIT (OUTPATIENT)
Dept: FAMILY MEDICINE CLINIC | Facility: CLINIC | Age: 27
End: 2022-10-24
Payer: COMMERCIAL

## 2022-10-24 VITALS
HEART RATE: 78 BPM | BODY MASS INDEX: 40.78 KG/M2 | DIASTOLIC BLOOD PRESSURE: 86 MMHG | HEIGHT: 67 IN | SYSTOLIC BLOOD PRESSURE: 128 MMHG | WEIGHT: 259.81 LBS

## 2022-10-24 DIAGNOSIS — Z79.4 UNCONTROLLED DIABETES MELLITUS WITH HYPERGLYCEMIA, WITH LONG-TERM CURRENT USE OF INSULIN (HCC): ICD-10-CM

## 2022-10-24 DIAGNOSIS — E11.65 UNCONTROLLED DIABETES MELLITUS WITH HYPERGLYCEMIA, WITH LONG-TERM CURRENT USE OF INSULIN (HCC): ICD-10-CM

## 2022-10-24 DIAGNOSIS — Z23 NEED FOR VACCINATION: ICD-10-CM

## 2022-10-24 DIAGNOSIS — Z00.00 ADULT GENERAL MEDICAL EXAM: Primary | ICD-10-CM

## 2022-10-24 DIAGNOSIS — K21.9 GASTROESOPHAGEAL REFLUX DISEASE, UNSPECIFIED WHETHER ESOPHAGITIS PRESENT: ICD-10-CM

## 2022-10-24 DIAGNOSIS — M79.18 RIGHT BUTTOCK PAIN: ICD-10-CM

## 2022-10-24 DIAGNOSIS — N48.1 BALANITIS: ICD-10-CM

## 2022-10-24 DIAGNOSIS — Z00.00 ADULT GENERAL MEDICAL EXAM: ICD-10-CM

## 2022-10-24 DIAGNOSIS — L73.0 ACNE NUCHAE KELOIDALIS: ICD-10-CM

## 2022-10-24 DIAGNOSIS — E78.2 MIXED HYPERLIPIDEMIA: ICD-10-CM

## 2022-10-24 PROBLEM — E66.01 MORBID (SEVERE) OBESITY DUE TO EXCESS CALORIES (HCC): Status: ACTIVE | Noted: 2022-10-24

## 2022-10-24 LAB
ALBUMIN SERPL-MCNC: 3.7 G/DL (ref 3.4–5)
ALBUMIN/GLOB SERPL: 1 {RATIO} (ref 1–2)
ALP LIVER SERPL-CCNC: 87 U/L
ALT SERPL-CCNC: 57 U/L
ANION GAP SERPL CALC-SCNC: 8 MMOL/L (ref 0–18)
AST SERPL-CCNC: 28 U/L (ref 15–37)
BASOPHILS # BLD AUTO: 0.06 X10(3) UL (ref 0–0.2)
BASOPHILS NFR BLD AUTO: 0.7 %
BILIRUB SERPL-MCNC: 0.7 MG/DL (ref 0.1–2)
BUN BLD-MCNC: 11 MG/DL (ref 7–18)
BUN/CREAT SERPL: 16.7 (ref 10–20)
CALCIUM BLD-MCNC: 9.2 MG/DL (ref 8.5–10.1)
CHLORIDE SERPL-SCNC: 107 MMOL/L (ref 98–112)
CHOLEST SERPL-MCNC: 184 MG/DL (ref ?–200)
CO2 SERPL-SCNC: 27 MMOL/L (ref 21–32)
CREAT BLD-MCNC: 0.66 MG/DL
CREAT UR-SCNC: 312 MG/DL
DEPRECATED RDW RBC AUTO: 38.8 FL (ref 35.1–46.3)
EOSINOPHIL # BLD AUTO: 0.17 X10(3) UL (ref 0–0.7)
EOSINOPHIL NFR BLD AUTO: 2 %
ERYTHROCYTE [DISTWIDTH] IN BLOOD BY AUTOMATED COUNT: 13.2 % (ref 11–15)
EST. AVERAGE GLUCOSE BLD GHB EST-MCNC: 258 MG/DL (ref 68–126)
FASTING PATIENT LIPID ANSWER: YES
FASTING STATUS PATIENT QL REPORTED: YES
GFR SERPLBLD BASED ON 1.73 SQ M-ARVRAT: 130 ML/MIN/1.73M2 (ref 60–?)
GLOBULIN PLAS-MCNC: 3.8 G/DL (ref 2.8–4.4)
GLUCOSE BLD-MCNC: 187 MG/DL (ref 70–99)
HBA1C MFR BLD: 10.6 % (ref ?–5.7)
HCT VFR BLD AUTO: 49.1 %
HDLC SERPL-MCNC: 33 MG/DL (ref 40–59)
HGB BLD-MCNC: 16.4 G/DL
IMM GRANULOCYTES # BLD AUTO: 0.04 X10(3) UL (ref 0–1)
IMM GRANULOCYTES NFR BLD: 0.5 %
LDLC SERPL CALC-MCNC: 99 MG/DL (ref ?–100)
LYMPHOCYTES # BLD AUTO: 2.63 X10(3) UL (ref 1–4)
LYMPHOCYTES NFR BLD AUTO: 30.8 %
MCH RBC QN AUTO: 27.3 PG (ref 26–34)
MCHC RBC AUTO-ENTMCNC: 33.4 G/DL (ref 31–37)
MCV RBC AUTO: 81.8 FL
MICROALBUMIN UR-MCNC: 64.1 MG/DL
MICROALBUMIN/CREAT 24H UR-RTO: 205.4 UG/MG (ref ?–30)
MONOCYTES # BLD AUTO: 0.41 X10(3) UL (ref 0.1–1)
MONOCYTES NFR BLD AUTO: 4.8 %
NEUTROPHILS # BLD AUTO: 5.23 X10 (3) UL (ref 1.5–7.7)
NEUTROPHILS # BLD AUTO: 5.23 X10(3) UL (ref 1.5–7.7)
NEUTROPHILS NFR BLD AUTO: 61.2 %
NONHDLC SERPL-MCNC: 151 MG/DL (ref ?–130)
OSMOLALITY SERPL CALC.SUM OF ELEC: 298 MOSM/KG (ref 275–295)
PLATELET # BLD AUTO: 273 10(3)UL (ref 150–450)
POTASSIUM SERPL-SCNC: 4.3 MMOL/L (ref 3.5–5.1)
PROT SERPL-MCNC: 7.5 G/DL (ref 6.4–8.2)
RBC # BLD AUTO: 6 X10(6)UL
SODIUM SERPL-SCNC: 142 MMOL/L (ref 136–145)
TRIGL SERPL-MCNC: 307 MG/DL (ref 30–149)
TSI SER-ACNC: 0.51 MIU/ML (ref 0.36–3.74)
VLDLC SERPL CALC-MCNC: 52 MG/DL (ref 0–30)
WBC # BLD AUTO: 8.5 X10(3) UL (ref 4–11)

## 2022-10-24 PROCEDURE — 90472 IMMUNIZATION ADMIN EACH ADD: CPT | Performed by: FAMILY MEDICINE

## 2022-10-24 PROCEDURE — 82570 ASSAY OF URINE CREATININE: CPT

## 2022-10-24 PROCEDURE — 84443 ASSAY THYROID STIM HORMONE: CPT

## 2022-10-24 PROCEDURE — 80061 LIPID PANEL: CPT

## 2022-10-24 PROCEDURE — 83036 HEMOGLOBIN GLYCOSYLATED A1C: CPT

## 2022-10-24 PROCEDURE — 99212 OFFICE O/P EST SF 10 MIN: CPT | Performed by: FAMILY MEDICINE

## 2022-10-24 PROCEDURE — 90677 PCV20 VACCINE IM: CPT | Performed by: FAMILY MEDICINE

## 2022-10-24 PROCEDURE — 3008F BODY MASS INDEX DOCD: CPT | Performed by: FAMILY MEDICINE

## 2022-10-24 PROCEDURE — 85025 COMPLETE CBC W/AUTO DIFF WBC: CPT

## 2022-10-24 PROCEDURE — 90686 IIV4 VACC NO PRSV 0.5 ML IM: CPT | Performed by: FAMILY MEDICINE

## 2022-10-24 PROCEDURE — 90471 IMMUNIZATION ADMIN: CPT | Performed by: FAMILY MEDICINE

## 2022-10-24 PROCEDURE — 80053 COMPREHEN METABOLIC PANEL: CPT

## 2022-10-24 PROCEDURE — 82043 UR ALBUMIN QUANTITATIVE: CPT

## 2022-10-24 PROCEDURE — 99395 PREV VISIT EST AGE 18-39: CPT | Performed by: FAMILY MEDICINE

## 2022-10-24 PROCEDURE — 3079F DIAST BP 80-89 MM HG: CPT | Performed by: FAMILY MEDICINE

## 2022-10-24 PROCEDURE — 36415 COLL VENOUS BLD VENIPUNCTURE: CPT

## 2022-10-24 PROCEDURE — 3074F SYST BP LT 130 MM HG: CPT | Performed by: FAMILY MEDICINE

## 2022-10-24 RX ORDER — PANTOPRAZOLE SODIUM 40 MG/1
40 TABLET, DELAYED RELEASE ORAL
Qty: 90 TABLET | Refills: 2 | Status: SHIPPED | OUTPATIENT
Start: 2022-10-24 | End: 2023-10-24

## 2022-10-24 RX ORDER — FENOFIBRATE 160 MG/1
160 TABLET ORAL DAILY
Qty: 90 TABLET | Refills: 2 | Status: SHIPPED | OUTPATIENT
Start: 2022-10-24

## 2022-10-24 RX ORDER — LISINOPRIL 5 MG/1
5 TABLET ORAL DAILY
Qty: 30 TABLET | Refills: 2 | Status: SHIPPED | OUTPATIENT
Start: 2022-10-24

## 2022-10-24 RX ORDER — EZETIMIBE 10 MG/1
10 TABLET ORAL DAILY
Qty: 90 TABLET | Refills: 2 | Status: SHIPPED | OUTPATIENT
Start: 2022-10-24

## 2022-10-24 RX ORDER — TRIAMCINOLONE ACETONIDE 1 MG/G
1 CREAM TOPICAL 2 TIMES DAILY PRN
Qty: 60 G | Refills: 0 | Status: SHIPPED | OUTPATIENT
Start: 2022-10-24

## 2022-10-24 RX ORDER — NYSTATIN 100000 U/G
CREAM TOPICAL
Qty: 30 G | Refills: 0 | Status: SHIPPED | OUTPATIENT
Start: 2022-10-24

## 2022-10-24 RX ORDER — ROSUVASTATIN CALCIUM 20 MG/1
20 TABLET, COATED ORAL NIGHTLY
Qty: 90 TABLET | Refills: 2 | Status: SHIPPED | OUTPATIENT
Start: 2022-10-24

## 2022-10-24 RX ORDER — NIACIN 500 MG
1000 TABLET ORAL NIGHTLY
Qty: 180 TABLET | Refills: 2 | Status: SHIPPED | OUTPATIENT
Start: 2022-10-24

## 2022-10-24 RX ORDER — BLOOD SUGAR DIAGNOSTIC
STRIP MISCELLANEOUS
Qty: 100 EACH | Refills: 11 | Status: SHIPPED | OUTPATIENT
Start: 2022-10-24

## 2022-10-24 RX ORDER — MELOXICAM 15 MG/1
15 TABLET ORAL DAILY
Qty: 30 TABLET | Refills: 0 | Status: SHIPPED | OUTPATIENT
Start: 2022-10-24 | End: 2022-11-23

## 2022-10-28 ENCOUNTER — TELEPHONE (OUTPATIENT)
Dept: FAMILY MEDICINE CLINIC | Facility: CLINIC | Age: 27
End: 2022-10-28

## 2022-10-28 NOTE — TELEPHONE ENCOUNTER
Patient's spouse, Reynold Treviño, is calling to advise PCP the following medication is not covered by insurance, HUMALOG and asking if PCP is able to prescribe the following medication that is covered by insurance. FIASP    Please advise.

## 2022-11-10 ENCOUNTER — TELEPHONE (OUTPATIENT)
Dept: FAMILY MEDICINE CLINIC | Facility: CLINIC | Age: 27
End: 2022-11-10

## 2022-11-10 DIAGNOSIS — E11.65 UNCONTROLLED DIABETES MELLITUS WITH HYPERGLYCEMIA, WITH LONG-TERM CURRENT USE OF INSULIN (HCC): ICD-10-CM

## 2022-11-10 DIAGNOSIS — Z79.4 UNCONTROLLED DIABETES MELLITUS WITH HYPERGLYCEMIA, WITH LONG-TERM CURRENT USE OF INSULIN (HCC): ICD-10-CM

## 2022-11-10 NOTE — TELEPHONE ENCOUNTER
Carole Storey, pt's spouse on the phone stating the pt needs a refill of insulin. Carole Storey states the pt changed insurance and the insurance covers the NOVOLOG Insulin. Pt is out of medication.  Please advise

## 2022-11-11 RX ORDER — INSULIN ASPART 100 [IU]/ML
INJECTION, SOLUTION INTRAVENOUS; SUBCUTANEOUS
Qty: 5 EACH | Refills: 1 | Status: SHIPPED | OUTPATIENT
Start: 2022-11-11 | End: 2022-11-12

## 2022-11-11 NOTE — TELEPHONE ENCOUNTER
Left a detailed message to his wife's cell (ok per HAI) regarding note below.  Advised to call back as needed

## 2022-11-11 NOTE — TELEPHONE ENCOUNTER
Dr Vital June, please see note below.  Patient's wife is calling as he is completely out of insulin    RN's call wife at [phone]

## 2022-11-12 ENCOUNTER — TELEPHONE (OUTPATIENT)
Dept: FAMILY MEDICINE CLINIC | Facility: CLINIC | Age: 27
End: 2022-11-12

## 2022-11-12 DIAGNOSIS — Z79.4 UNCONTROLLED DIABETES MELLITUS WITH HYPERGLYCEMIA, WITH LONG-TERM CURRENT USE OF INSULIN (HCC): ICD-10-CM

## 2022-11-12 DIAGNOSIS — E11.65 UNCONTROLLED DIABETES MELLITUS WITH HYPERGLYCEMIA, WITH LONG-TERM CURRENT USE OF INSULIN (HCC): ICD-10-CM

## 2022-11-12 NOTE — TELEPHONE ENCOUNTER
RN called patient's wife. Patient's date of birth and full name both confirmed. She says [facility] has the Rx, but now will not dispense due to no specific instructions.    Duplicate encounter - see 11/12/22 Med Question Telephone Encounter

## 2022-11-12 NOTE — TELEPHONE ENCOUNTER
Dr. Eartha Shone - Rx received, but now, Specific Sliding Scale details needed. Pharmacy won't dispense without these Details. Please advise. insulin aspart (NOVOLOG FLEXPEN) 100 UNIT/ML Subcutaneous Solution Pen-injector         RN spoke with wife, via Refill Encounter. Patient's date of birth and full name both confirmed. And she says [facility] needs instructions and they will no dispense without instructions.

## 2022-11-12 NOTE — TELEPHONE ENCOUNTER
Pharmacy calling regarding insulin aspart (NOVOLOG FLEXPEN) 100 UNIT/ML Subcutaneous Solution Pen-injector [  They need exact dosing information.    Patient is out of medication

## 2022-11-14 ENCOUNTER — TELEPHONE (OUTPATIENT)
Dept: FAMILY MEDICINE CLINIC | Facility: CLINIC | Age: 27
End: 2022-11-14

## 2022-11-14 NOTE — TELEPHONE ENCOUNTER
Is the pharmacist just confused? I have already sent in HumaLog at 32 units 3 times daily. Delete the sliding scale insulin.

## 2022-11-14 NOTE — TELEPHONE ENCOUNTER
pharmcy called reg medication below still waiting on response for maximum daily dose, due to insurance.

## 2022-11-14 NOTE — TELEPHONE ENCOUNTER
Left message that new prescription was sent and to reach out to [place] [address] for refill. Advised wife to call back if anything further was needed. Mychart message sent as well. After message left RN realized they requested medication to [facility] but it was sent to [place] [address]. Resent to [facility].

## 2022-11-14 NOTE — TELEPHONE ENCOUNTER
Patient's wife called again. Frustrated that prescriptions are not getting sent timely manner and went to wrong pharmacy. Chart reviewed. Explained most recent RX for lispro went to [facility] in [address] at 3:30pm.    (I do see prior to that it was sent to [facility] earlier today, and that was a sent to wrong pharm). NO FURTHER ACTION NEEDED.

## 2023-03-23 ENCOUNTER — HOSPITAL ENCOUNTER (EMERGENCY)
Facility: HOSPITAL | Age: 28
Discharge: HOME | End: 2023-03-23
Attending: EMERGENCY MEDICINE

## 2023-03-23 VITALS
SYSTOLIC BLOOD PRESSURE: 136 MMHG | OXYGEN SATURATION: 97 % | BODY MASS INDEX: 44.73 KG/M2 | HEIGHT: 67 IN | RESPIRATION RATE: 22 BRPM | DIASTOLIC BLOOD PRESSURE: 87 MMHG | WEIGHT: 285 LBS | HEART RATE: 98 BPM | TEMPERATURE: 97 F

## 2023-03-23 DIAGNOSIS — J98.8 RESPIRATORY TRACT INFECTION: Primary | ICD-10-CM

## 2023-03-23 LAB
FLUAV + FLUBV RNA SPEC NAA+PROBE: NEGATIVE
FLUAV + FLUBV RNA SPEC NAA+PROBE: NEGATIVE
RSV RNA SPEC NAA+PROBE: NEGATIVE
SARS-COV-2 RNA RESP QL NAA+PROBE: NOT DETECTED

## 2023-03-23 PROCEDURE — 99283 EMERGENCY DEPT VISIT LOW MDM: CPT

## 2023-03-23 PROCEDURE — 0241U SARS-COV-2/FLU A AND B/RSV BY PCR (GENEXPERT): CPT | Performed by: EMERGENCY MEDICINE

## 2023-03-23 RX ORDER — DIPHENHYDRAMINE HCL 25 MG
CAPSULE ORAL
Qty: 30 CAPSULE | Refills: 0 | Status: SHIPPED | OUTPATIENT
Start: 2023-03-23

## 2023-03-23 RX ORDER — DIPHENHYDRAMINE HCL 25 MG
50 CAPSULE ORAL ONCE
Status: COMPLETED | OUTPATIENT
Start: 2023-03-23 | End: 2023-03-23

## 2023-03-23 NOTE — DISCHARGE INSTRUCTIONS
Return to emergency department for any worsening symptoms including but not limited to weakness, numbness, changes in mentation. Your symptoms seem most consistent with a viral illness. Please drink plenty of fluids and take plenty of rest.  Follow with your primary care provider in the next  few days for reevaluation. Please follow on MyChart for your results of your swab today. If you are COVID-positive, please quarantine per the latest CDC guidelines. The Emergency Department is not intended to be a substitute for an effort to provide complete medical care. The imaging, if any, have often been interpreted on a preliminary basis pending final reading by the radiologist. If your blood pressure was greater than 140/90, please have this blood pressure rechecked by your primary care provider in the next several days.

## 2023-04-08 DIAGNOSIS — E11.65 UNCONTROLLED DIABETES MELLITUS WITH HYPERGLYCEMIA, WITH LONG-TERM CURRENT USE OF INSULIN (HCC): ICD-10-CM

## 2023-04-08 DIAGNOSIS — Z79.4 UNCONTROLLED DIABETES MELLITUS WITH HYPERGLYCEMIA, WITH LONG-TERM CURRENT USE OF INSULIN (HCC): ICD-10-CM

## 2023-04-09 RX ORDER — LISINOPRIL 5 MG/1
5 TABLET ORAL DAILY
Qty: 90 TABLET | Refills: 3 | Status: SHIPPED | OUTPATIENT
Start: 2023-04-09

## 2023-04-09 NOTE — TELEPHONE ENCOUNTER
Refill passed per [place], [facility] protocol. CSS, please assist patient with scheduling appointment for follow-up. Thank you.     Requested Prescriptions   Pending Prescriptions Disp Refills    LISINOPRIL 5 MG Oral Tab [Pharmacy Med Name: Lisinopril 5 MG Oral Tablet] 30 tablet 0     Sig: TAKE 1 TABLET (5 MG TOTAL) BY MOUTH ONCE DAILY FOR HIGH BLOOD PRESSURE OR KIDNEY PROTECTION       Hypertensive Medications Protocol Passed - 4/8/2023 11:02 AM        Passed - In person appointment in the past 12 or next 3 months     Recent Outpatient Visits              5 months ago Adult general medical exam    [address], [address], [address], [place], [place]    Office Visit    7 months ago Tension headache    [address] [facility], [address], [place], [place]    Office Visit    1 year ago Uncontrolled diabetes mellitus with hyperglycemia, with long-term current use of insulin ([facility]    [address], [address], [place], DPM    Office Visit    1 year ago Uncontrolled type 2 diabetes mellitus with hyperglycemia, without long-term current use of insulin ([facility]    [address], [address], [facility], [facility]    Office Visit    1 year ago Uncontrolled diabetes mellitus with hyperglycemia, with long-term current use of insulin ([facility]    [address], [address], Louie Griffith [place], [place]    Office Visit                      Passed - Last BP reading less than 140/90     BP Readings from Last 1 Encounters:  03/23/23 : 136/87              Passed - CMP or BMP in past 6 months     Recent Results (from the past 4392 hour(s))   COMP METABOLIC PANEL (14)    Collection Time: 10/24/22  9:34 AM   Result Value Ref Range    Glucose 187 (H) 70 - 99 mg/dL    Sodium 142 136 - 145 mmol/L    Potassium 4.3 3.5 - 5.1 mmol/L    Chloride 107 98 - 112 mmol/L    CO2 27.0 21.0 - 32.0 mmol/L    Anion Gap 8 0 - 18 mmol/L    BUN 11 7 - 18 mg/dL    Creatinine 0.66 (L) 0.70 - 1.30 mg/dL    BUN/CREA Ratio 16.7 10.0 - 20.0    Calcium, Total 9.2 8.5 - 10.1 mg/dL    Calculated Osmolality 298 (H) 275 - 295 mOsm/kg    eGFR-Cr 130 >=60 mL/min/1.73m2    ALT 57 16 - 61 U/L    AST 28 15 - 37 U/L    Alkaline Phosphatase 87 45 - 117 U/L    Bilirubin, Total 0.7 0.1 - 2.0 mg/dL    Total Protein 7.5 6.4 - 8.2 g/dL    Albumin 3.7 3.4 - 5.0 g/dL    Globulin  3.8 2.8 - 4.4 g/dL    A/G Ratio 1.0 1.0 - 2.0    Patient Fasting for CMP? Yes      *Note: Due to a large number of results and/or encounters for the requested time period, some results have not been displayed. A complete set of results can be found in Results Review.                Passed - In person appointment or virtual visit in the past 6 months     Recent Outpatient Visits              5 months ago Adult general medical exam    [id number] [facility], [address], [place],     Office Visit    7 months ago Tension headache    [address] [facility], [address], [place],     Office Visit    1 year ago Uncontrolled diabetes mellitus with hyperglycemia, with long-term current use of insulin ([address]    [address], Louie Daley [place], [place]    Office Visit    1 year ago Uncontrolled type 2 diabetes mellitus with hyperglycemia, without long-term current use of insulin ([address]    Aline Gutierrez, [address] [facility] [address], [facility]    Office Visit    1 year ago Uncontrolled diabetes mellitus with hyperglycemia, with long-term current use of insulin ([address]    [address], Louie Ram [place]    Office Visit                      Passed - EGFRCR or GFRNAA > 50     GFR Evaluation  EGFRCR: 130 , resulted on 10/24/2022               Recent Outpatient Visits              5 months ago Adult general medical exam    [address] [facility], Louie Ram, [place] Office Visit    7 months ago Tension headache    Clara Toney, [address], [place], [place]    Office Visit    1 year ago Uncontrolled diabetes mellitus with hyperglycemia, with long-term current use of insulin ([facility]    [address], [address], Yolanda [place], [place]    Office Visit    1 year ago Uncontrolled type 2 diabetes mellitus with hyperglycemia, without long-term current use of insulin ([facility]    [address], [address], [facility]    Office Visit    1 year ago Uncontrolled diabetes mellitus with hyperglycemia, with long-term current use of insulin [facility])    [address], [address], [address], [place], [place]    Office Visit

## 2023-05-08 ENCOUNTER — OFFICE VISIT (OUTPATIENT)
Dept: FAMILY MEDICINE CLINIC | Facility: CLINIC | Age: 28
End: 2023-05-08

## 2023-05-08 ENCOUNTER — LAB ENCOUNTER (OUTPATIENT)
Dept: LAB | Age: 28
End: 2023-05-08
Attending: FAMILY MEDICINE
Payer: COMMERCIAL

## 2023-05-08 VITALS
WEIGHT: 269 LBS | TEMPERATURE: 97 F | SYSTOLIC BLOOD PRESSURE: 124 MMHG | HEART RATE: 93 BPM | HEIGHT: 67 IN | BODY MASS INDEX: 42.22 KG/M2 | DIASTOLIC BLOOD PRESSURE: 85 MMHG

## 2023-05-08 DIAGNOSIS — E11.65 UNCONTROLLED DIABETES MELLITUS WITH HYPERGLYCEMIA, WITH LONG-TERM CURRENT USE OF INSULIN (HCC): ICD-10-CM

## 2023-05-08 DIAGNOSIS — Z79.4 UNCONTROLLED DIABETES MELLITUS WITH HYPERGLYCEMIA, WITH LONG-TERM CURRENT USE OF INSULIN (HCC): Primary | ICD-10-CM

## 2023-05-08 DIAGNOSIS — Z79.4 UNCONTROLLED DIABETES MELLITUS WITH HYPERGLYCEMIA, WITH LONG-TERM CURRENT USE OF INSULIN (HCC): ICD-10-CM

## 2023-05-08 DIAGNOSIS — E78.2 MIXED HYPERLIPIDEMIA: ICD-10-CM

## 2023-05-08 DIAGNOSIS — N46.9 INFERTILITY MALE: ICD-10-CM

## 2023-05-08 DIAGNOSIS — E11.65 UNCONTROLLED DIABETES MELLITUS WITH HYPERGLYCEMIA, WITH LONG-TERM CURRENT USE OF INSULIN (HCC): Primary | ICD-10-CM

## 2023-05-08 DIAGNOSIS — E11.65 UNCONTROLLED TYPE 2 DIABETES MELLITUS WITH HYPERGLYCEMIA, WITHOUT LONG-TERM CURRENT USE OF INSULIN (HCC): ICD-10-CM

## 2023-05-08 LAB
ANION GAP SERPL CALC-SCNC: 9 MMOL/L (ref 0–18)
BUN BLD-MCNC: 16 MG/DL (ref 7–18)
BUN/CREAT SERPL: 26.2 (ref 10–20)
CALCIUM BLD-MCNC: 8.4 MG/DL (ref 8.5–10.1)
CHLORIDE SERPL-SCNC: 104 MMOL/L (ref 98–112)
CHOLEST SERPL-MCNC: 253 MG/DL (ref ?–200)
CO2 SERPL-SCNC: 23 MMOL/L (ref 21–32)
CREAT BLD-MCNC: 0.61 MG/DL
EST. AVERAGE GLUCOSE BLD GHB EST-MCNC: 263 MG/DL (ref 68–126)
FASTING PATIENT LIPID ANSWER: YES
FASTING STATUS PATIENT QL REPORTED: YES
GFR SERPLBLD BASED ON 1.73 SQ M-ARVRAT: 133 ML/MIN/1.73M2 (ref 60–?)
GLUCOSE BLD-MCNC: 309 MG/DL (ref 70–99)
HBA1C MFR BLD: 10.8 % (ref ?–5.7)
HDLC SERPL-MCNC: 23 MG/DL (ref 40–59)
LDLC SERPL DIRECT ASSAY-MCNC: 69 MG/DL (ref ?–100)
NONHDLC SERPL-MCNC: 230 MG/DL (ref ?–130)
OSMOLALITY SERPL CALC.SUM OF ELEC: 295 MOSM/KG (ref 275–295)
POTASSIUM SERPL-SCNC: 4 MMOL/L (ref 3.5–5.1)
SODIUM SERPL-SCNC: 136 MMOL/L (ref 136–145)
TRIGL SERPL-MCNC: 1973 MG/DL (ref 30–149)

## 2023-05-08 PROCEDURE — 3074F SYST BP LT 130 MM HG: CPT | Performed by: FAMILY MEDICINE

## 2023-05-08 PROCEDURE — 3046F HEMOGLOBIN A1C LEVEL >9.0%: CPT | Performed by: NURSE PRACTITIONER

## 2023-05-08 PROCEDURE — 3008F BODY MASS INDEX DOCD: CPT | Performed by: FAMILY MEDICINE

## 2023-05-08 PROCEDURE — 83036 HEMOGLOBIN GLYCOSYLATED A1C: CPT

## 2023-05-08 PROCEDURE — 36415 COLL VENOUS BLD VENIPUNCTURE: CPT

## 2023-05-08 PROCEDURE — 99214 OFFICE O/P EST MOD 30 MIN: CPT | Performed by: FAMILY MEDICINE

## 2023-05-08 PROCEDURE — 83721 ASSAY OF BLOOD LIPOPROTEIN: CPT

## 2023-05-08 PROCEDURE — 80061 LIPID PANEL: CPT

## 2023-05-08 PROCEDURE — 80048 BASIC METABOLIC PNL TOTAL CA: CPT

## 2023-05-08 PROCEDURE — 3079F DIAST BP 80-89 MM HG: CPT | Performed by: FAMILY MEDICINE

## 2023-05-13 ENCOUNTER — LAB ENCOUNTER (OUTPATIENT)
Dept: LAB | Facility: HOSPITAL | Age: 28
End: 2023-05-13
Attending: FAMILY MEDICINE
Payer: COMMERCIAL

## 2023-05-13 DIAGNOSIS — N46.9 INFERTILITY MALE: ICD-10-CM

## 2023-05-13 LAB
PH SMN: 8.5 [PH] (ref 7.2–8.3)
SPECIMEN VOL SMN: 1.5 ML (ref 1.5–6)
SPERM # SMN: 58.1 MILL/ML (ref 15–150)
SPERM IMMOTILE NFR SMN: 13 %
SPERM IMMOTILE NFR SMN: 34 %
SPERM PROG NFR SMN: 53 %
VISC SMN QL: NORMAL

## 2023-05-13 PROCEDURE — 89320 SEMEN ANAL VOL/COUNT/MOT: CPT

## 2023-05-15 ENCOUNTER — PATIENT MESSAGE (OUTPATIENT)
Dept: FAMILY MEDICINE CLINIC | Facility: CLINIC | Age: 28
End: 2023-05-15

## 2023-05-16 NOTE — TELEPHONE ENCOUNTER
From: Swetha Hardy  To: Michelle Pinedo DO  Sent: 5/15/2023 8:05 PM CDT  Subject: Question regarding HEMOGLOBIN A1C    Hello  I take it most nights, I have noticed when I take the Niacin I have reaction. Within a few minutes of taking it I turn red, itchy and warm. It goes away 1 to 2 hrs before.

## 2023-05-17 ENCOUNTER — PATIENT MESSAGE (OUTPATIENT)
Dept: FAMILY MEDICINE CLINIC | Facility: CLINIC | Age: 28
End: 2023-05-17

## 2023-05-17 DIAGNOSIS — K21.9 GASTROESOPHAGEAL REFLUX DISEASE, UNSPECIFIED WHETHER ESOPHAGITIS PRESENT: ICD-10-CM

## 2023-05-17 DIAGNOSIS — E11.65 UNCONTROLLED DIABETES MELLITUS WITH HYPERGLYCEMIA, WITH LONG-TERM CURRENT USE OF INSULIN (HCC): ICD-10-CM

## 2023-05-17 DIAGNOSIS — E78.2 MIXED HYPERLIPIDEMIA: ICD-10-CM

## 2023-05-17 DIAGNOSIS — Z79.4 UNCONTROLLED DIABETES MELLITUS WITH HYPERGLYCEMIA, WITH LONG-TERM CURRENT USE OF INSULIN (HCC): ICD-10-CM

## 2023-05-18 RX ORDER — INSULIN ASPART 100 [IU]/ML
32 INJECTION, SOLUTION INTRAVENOUS; SUBCUTANEOUS
Qty: 100 ML | Refills: 3 | Status: SHIPPED | OUTPATIENT
Start: 2023-05-18

## 2023-05-18 RX ORDER — FENOFIBRATE 160 MG/1
160 TABLET ORAL DAILY
Qty: 90 TABLET | Refills: 3 | Status: SHIPPED | OUTPATIENT
Start: 2023-05-18

## 2023-05-18 RX ORDER — NIACIN 500 MG
1000 TABLET ORAL NIGHTLY
Qty: 180 TABLET | Refills: 3 | Status: SHIPPED | OUTPATIENT
Start: 2023-05-18

## 2023-05-18 RX ORDER — EZETIMIBE 10 MG/1
10 TABLET ORAL DAILY
Qty: 90 TABLET | Refills: 3 | Status: SHIPPED | OUTPATIENT
Start: 2023-05-18

## 2023-05-18 RX ORDER — ROSUVASTATIN CALCIUM 20 MG/1
20 TABLET, COATED ORAL NIGHTLY
Qty: 90 TABLET | Refills: 3 | Status: SHIPPED | OUTPATIENT
Start: 2023-05-18

## 2023-05-18 RX ORDER — PANTOPRAZOLE SODIUM 40 MG/1
40 TABLET, DELAYED RELEASE ORAL
Qty: 90 TABLET | Refills: 3 | Status: SHIPPED | OUTPATIENT
Start: 2023-05-18 | End: 2024-05-17

## 2023-05-18 NOTE — TELEPHONE ENCOUNTER
Refill passed per [place] [facility] protocol. Requested Prescriptions   Pending Prescriptions Disp Refills    insulin aspart (NOVOLOG) 100 Units/mL Injection Solution 100 mL 3     Sig: Inject 32 Units into the skin 3 (three) times daily before meals. Diabetes Medication Protocol Failed - 5/18/2023  2:59 PM        Failed - Last A1C < 7.5 and within past 6 months     Lab Results   Component Value Date    A1C 10.8 (H) 05/08/2023               Passed - In person appointment or virtual visit in the past 6 mos or appointment in next 3 mos     Recent Outpatient Visits              1 week ago Uncontrolled diabetes mellitus with hyperglycemia, with long-term current use of insulin ([facility]    [address], [address], [place]ie [place], [place]    Office Visit    6 months ago Adult general medical exam    [address], [address], [address], [place], [place]    Office Visit    8 months ago Tension headache    [address] [facility], [address], [place], [place]    Office Visit    1 year ago Uncontrolled diabetes mellitus with hyperglycemia, with long-term current use of insulin ([facility]    [address], [address], Consuelo [place], DPM    Office Visit    1 year ago Uncontrolled type 2 diabetes mellitus with hyperglycemia, without long-term current use of insulin ([facility]    [address] [facility], Sergio Kruse, APRN    Office Visit                      Passed - EGFRCR or GFRNAA > 50     GFR Evaluation  EGFRCR: 133 , resulted on 5/8/2023            Passed - GFR in the past 12 months          niacin 500 MG Oral Tab 180 tablet 3     Sig: Take 2 tablets (1,000 mg total) by mouth nightly. There is no refill protocol information for this order       rosuvastatin 20 MG Oral Tab 90 tablet 3     Sig: Take 1 tablet (20 mg total) by mouth nightly. For cholesterol.        Cholesterol Medication Protocol Passed - 5/18/2023  2:59 PM        Passed - ALT in past 12 months        Passed - LDL in past 12 months        Passed - Last ALT < 80     Lab Results   Component Value Date    ALT 57 10/24/2022             Passed - Last LDL < 130     Lab Results   Component Value Date    LDL  05/08/2023      Comment:      Unable to calculate LDL due to Triglycerides >800 mg/dL        Desirable <100 mg/dL   Borderline 100-129 mg/dL   High     >=130mg/dL                   Passed - In person appointment or virtual visit in the past 12 mos or appointment in next 3 mos     Recent Outpatient Visits              1 week ago Uncontrolled diabetes mellitus with hyperglycemia, with long-term current use of insulin ([address]    [address], Louie Tejeda, [place]    Office Visit    6 months ago Adult general medical exam    [address], [address], [place], [place]    Office Visit    8 months ago Tension headache    [address] [facility], [address], [place], [place]    Office Visit    1 year ago Uncontrolled diabetes mellitus with hyperglycemia, with long-term current use of insulin ([address]    [address], Jaspreet [place], [place]    Office Visit    1 year ago Uncontrolled type 2 diabetes mellitus with hyperglycemia, without long-term current use of insulin ([address]    [address] [facility], Sergio Kruse APRN    Office Visit                        ezetimibe 10 MG Oral Tab 90 tablet 3     Sig: Take 1 tablet (10 mg total) by mouth daily.        Cholesterol Medication Protocol Passed - 5/18/2023  2:59 PM        Passed - ALT in past 12 months        Passed - LDL in past 12 months        Passed - Last ALT < 80     Lab Results   Component Value Date    ALT 57 10/24/2022             Passed - Last LDL < 130     Lab Results   Component Value Date    LDL  05/08/2023      Comment:      Unable to calculate LDL due to Triglycerides >800 mg/dL        Desirable <100 mg/dL   Borderline 100-129 mg/dL   High     >=130mg/dL                   Passed - In person appointment or virtual visit in the past 12 mos or appointment in next 3 mos     Recent Outpatient Visits              1 week ago Uncontrolled diabetes mellitus with hyperglycemia, with long-term current use of insulin ([address]    [address], [address], Louie Mccarty, DO    Office Visit    6 months ago Adult general medical exam    [address], [address], [address], [place], [place]    Office Visit    8 months ago Tension headache    [address] [facility], [address], [place], [place]    Office Visit    1 year ago Uncontrolled diabetes mellitus with hyperglycemia, with long-term current use of insulin ([address]    [address], [address], [place]addeus [place], [place]    Office Visit    1 year ago Uncontrolled type 2 diabetes mellitus with hyperglycemia, without long-term current use of insulin ([address]    [address], [address], [facility]    Office Visit                        metFORMIN HCl 1000 MG Oral Tab 180 tablet 3     Sig: Take 1 tablet (1,000 mg total) by mouth 2 (two) times daily with meals.        Diabetes Medication Protocol Failed - 5/18/2023  2:59 PM        Failed - Last A1C < 7.5 and within past 6 months     Lab Results   Component Value Date    A1C 10.8 (H) 05/08/2023               Passed - In person appointment or virtual visit in the past 6 mos or appointment in next 3 mos     Recent Outpatient Visits              1 week ago Uncontrolled diabetes mellitus with hyperglycemia, with long-term current use of insulin ([address]    [address], [address], Louie Mccarty, [place]    Office Visit    6 months ago Adult general medical exam    [address] [facility], [address], [place], [place]    Office Visit    8 months ago Tension headache    [facility], [address], [address], [place], [place]    Office Visit    1 year ago Uncontrolled diabetes mellitus with hyperglycemia, with long-term current use of insulin ([facility]    [address], [address], Yovani [place], [place]    Office Visit    1 year ago Uncontrolled type 2 diabetes mellitus with hyperglycemia, without long-term current use of insulin ([facility]    [address] [facility], [place], [facility]    Office Visit                      Passed - EGFRCR or GFRNAA > 50     GFR Evaluation  EGFRCR: 133 , resulted on 5/8/2023            Passed - GFR in the past 12 months          Fenofibrate 160 MG Oral Tab 90 tablet 3     Sig: Take 1 tablet (160 mg total) by mouth daily.        Cholesterol Medication Protocol Passed - 5/18/2023  2:59 PM        Passed - ALT in past 12 months        Passed - LDL in past 12 months        Passed - Last ALT < 80     Lab Results   Component Value Date    ALT 57 10/24/2022             Passed - Last LDL < 130     Lab Results   Component Value Date    LDL  05/08/2023      Comment:      Unable to calculate LDL due to Triglycerides >800 mg/dL        Desirable <100 mg/dL   Borderline 100-129 mg/dL   High     >=130mg/dL                   Passed - In person appointment or virtual visit in the past 12 mos or appointment in next 3 mos     Recent Outpatient Visits              1 week ago Uncontrolled diabetes mellitus with hyperglycemia, with long-term current use of insulin ([facility]    [address], [address], Louie Espino, [place]    Office Visit    6 months ago Adult general medical exam    [address], [address], [address], [place], [place]    Office Visit    8 months ago Tension headache    [facility]    [facility]    1 year ago Uncontrolled diabetes mellitus with hyperglycemia, with long-term current use of insulin ([address]    [address] [facility], [place], [place]    Office Visit    1 year ago Uncontrolled type 2 diabetes mellitus with hyperglycemia, without long-term current use of insulin ([address]    [address], [address], [place], [place], APRN    Office Visit                        pantoprazole 40 MG Oral Tab EC 90 tablet 3     Sig: Take 1 tablet (40 mg total) by mouth every morning before breakfast. To help with stomach acid and stomach irritation/inflammation       Gastrointestional Medication Protocol Passed - 5/18/2023  2:59 PM        Passed - In person appointment or virtual visit in the past 12 mos or appointment in next 3 mos     Recent Outpatient Visits              1 week ago Uncontrolled diabetes mellitus with hyperglycemia, with long-term current use of insulin ([address]    [address], [address], Louie [place]as [place], [place]    Office Visit    6 months ago Adult general medical exam    [address] [facility], [address], [place], [place]    Office Visit    8 months ago Tension headache    [address] [facility], [address], [place], [place]    Office Visit    1 year ago Uncontrolled diabetes mellitus with hyperglycemia, with long-term current use of insulin ([address]    [address] [facility], [place]    Office Visit    1 year ago Uncontrolled type 2 diabetes mellitus with hyperglycemia, without long-term current use of insulin ([address]    [address], [address], [place], [place], APRN    Office Visit                                Recent Outpatient Visits              1 week ago Uncontrolled diabetes mellitus with hyperglycemia, with long-term current use of insulin ([address]    [address] [facility], Louie Frias [place], [place]    Office Visit    6 months ago Adult general medical exam [address] [facility], [address], [place], [place]    Office Visit    8 months ago Tension headache    [facility], [address], [address], [place], [place]    Office Visit    1 year ago Uncontrolled diabetes mellitus with hyperglycemia, with long-term current use of insulin [facility])    [address], [address], Yovani [place], [place]    Office Visit    1 year ago Uncontrolled type 2 diabetes mellitus with hyperglycemia, without long-term current use of insulin ([facility]    [address], [address], Sergio Kruse, [place]    Office Visit

## 2023-05-18 NOTE — TELEPHONE ENCOUNTER
From: Yari Choudhary  To: Mónica Le DO  Sent: 5/17/2023 6:05 PM CDT  Subject: Med Refill     Hello! If you can please send refills on all my medications and insulin. I have a few days left.  My current pharmacy is [facility] in [address] [place] [address]

## 2023-05-18 NOTE — TELEPHONE ENCOUNTER
Please review. Protocol failed/ No protocol      Requested Prescriptions   Pending Prescriptions Disp Refills    insulin aspart (NOVOLOG) 100 Units/mL Injection Solution 100 mL 3     Sig: Inject 32 Units into the skin 3 (three) times daily before meals. Diabetes Medication Protocol Failed - 5/18/2023  2:59 PM        Failed - Last A1C < 7.5 and within past 6 months     Lab Results   Component Value Date    A1C 10.8 (H) 05/08/2023               Passed - In person appointment or virtual visit in the past 6 mos or appointment in next 3 mos     Recent Outpatient Visits              1 week ago Uncontrolled diabetes mellitus with hyperglycemia, with long-term current use of insulin ([facility]    [address], [address], [place], DO    Office Visit    6 months ago Adult general medical exam    [address], [address], [address], [place], [place]    Office Visit    8 months ago Tension headache    Alexandra [address]oa [address], [place], [place]    Office Visit    1 year ago Uncontrolled diabetes mellitus with hyperglycemia, with long-term current use of insulin ([facility]    [address], [address], Yolanda [place], DPM    Office Visit    1 year ago Uncontrolled type 2 diabetes mellitus with hyperglycemia, without long-term current use of insulin ([facility]    Aixa Garcia Torrington, DARYL    Office Visit                      Passed - EGFRCR or GFRNAA > 50     GFR Evaluation  EGFRCR: 133 , resulted on 5/8/2023            Passed - GFR in the past 12 months          niacin 500 MG Oral Tab 180 tablet 3     Sig: Take 2 tablets (1,000 mg total) by mouth nightly. There is no refill protocol information for this order       metFORMIN HCl 1000 MG Oral Tab 180 tablet 3     Sig: Take 1 tablet (1,000 mg total) by mouth 2 (two) times daily with meals.        Diabetes Medication Protocol Failed - 5/18/2023  2:59 PM        Failed - Last A1C < 7.5 and within past 6 months     Lab Results   Component Value Date    A1C 10.8 (H) 05/08/2023               Passed - In person appointment or virtual visit in the past 6 mos or appointment in next 3 mos     Recent Outpatient Visits              1 week ago Uncontrolled diabetes mellitus with hyperglycemia, with long-term current use of insulin ([address]    [address], [place], DO    Office Visit    6 months ago Adult general medical exam    [address], [address], [place], [place]    Office Visit    8 months ago Tension headache    [address] [facility], [address], [place], [place]    Office Visit    1 year ago Uncontrolled diabetes mellitus with hyperglycemia, with long-term current use of insulin ([address]    [address], [place], [place]    Office Visit    1 year ago Uncontrolled type 2 diabetes mellitus with hyperglycemia, without long-term current use of insulin ([address]    [address] [facility], Sergio Kruse, DARYL    Office Visit                      Passed - EGFRCR or GFRNAA > 50     GFR Evaluation  EGFRCR: 133 , resulted on 5/8/2023            Passed - GFR in the past 12 months         Signed Prescriptions Disp Refills    rosuvastatin 20 MG Oral Tab 90 tablet 3     Sig: Take 1 tablet (20 mg total) by mouth nightly. For cholesterol.        Cholesterol Medication Protocol Passed - 5/18/2023  2:59 PM        Passed - ALT in past 12 months        Passed - LDL in past 12 months        Passed - Last ALT < 80     Lab Results   Component Value Date    ALT 57 10/24/2022             Passed - Last LDL < 130     Lab Results   Component Value Date    LDL  05/08/2023      Comment:      Unable to calculate LDL due to Triglycerides >800 mg/dL        Desirable <100 mg/dL   Borderline 100-129 mg/dL   High >=130mg/dL                   Passed - In person appointment or virtual visit in the past 12 mos or appointment in next 3 mos     Recent Outpatient Visits              1 week ago Uncontrolled diabetes mellitus with hyperglycemia, with long-term current use of insulin ([address]    [address], [address], [place], DO    Office Visit    6 months ago Adult general medical exam    Elena Bunch, [address], [place], DO    Office Visit    8 months ago Tension headache    Elena Bunch, [address], [place], DO    Office Visit    1 year ago Uncontrolled diabetes mellitus with hyperglycemia, with long-term current use of insulin ([address]    [address], Clarence [place], [place]    Office Visit    1 year ago Uncontrolled type 2 diabetes mellitus with hyperglycemia, without long-term current use of insulin ([address]    Aixa Browning Torrington, DARYL    Office Visit                        ezetimibe 10 MG Oral Tab 90 tablet 3     Sig: Take 1 tablet (10 mg total) by mouth daily.        Cholesterol Medication Protocol Passed - 5/18/2023  2:59 PM        Passed - ALT in past 12 months        Passed - LDL in past 12 months        Passed - Last ALT < 80     Lab Results   Component Value Date    ALT 57 10/24/2022             Passed - Last LDL < 130     Lab Results   Component Value Date    LDL  05/08/2023      Comment:      Unable to calculate LDL due to Triglycerides >800 mg/dL        Desirable <100 mg/dL   Borderline 100-129 mg/dL   High     >=130mg/dL                   Passed - In person appointment or virtual visit in the past 12 mos or appointment in next 3 mos     Recent Outpatient Visits              1 week ago Uncontrolled diabetes mellitus with hyperglycemia, with long-term current use of insulin ([address]    Guy [address] Jamin, Louie Pam Loues, DO    Office Visit    6 months ago Adult general medical exam    [address], [address], [place], [place]    Office Visit    8 months ago Tension headache    [address] [facility], [address], [place], DO    Office Visit    1 year ago Uncontrolled diabetes mellitus with hyperglycemia, with long-term current use of insulin ([address]    [address], Rubio [place], [place]    Office Visit    1 year ago Uncontrolled type 2 diabetes mellitus with hyperglycemia, without long-term current use of insulin ([address]    Alex Pitt, [address], Sergio Kruse APRN    Office Visit                        Fenofibrate 160 MG Oral Tab 90 tablet 3     Sig: Take 1 tablet (160 mg total) by mouth daily.        Cholesterol Medication Protocol Passed - 5/18/2023  2:59 PM        Passed - ALT in past 12 months        Passed - LDL in past 12 months        Passed - Last ALT < 80     Lab Results   Component Value Date    ALT 57 10/24/2022             Passed - Last LDL < 130     Lab Results   Component Value Date    LDL  05/08/2023      Comment:      Unable to calculate LDL due to Triglycerides >800 mg/dL        Desirable <100 mg/dL   Borderline 100-129 mg/dL   High     >=130mg/dL                   Passed - In person appointment or virtual visit in the past 12 mos or appointment in next 3 mos     Recent Outpatient Visits              1 week ago Uncontrolled diabetes mellitus with hyperglycemia, with long-term current use of insulin ([address]    [address], [place] Fin[place], DO    Office Visit    6 months ago Adult general medical exam    [address], [address], [place], [place]    Office Visit    8 months ago Tension headache    [address], [place] Pam Dines, DO    Office Visit    1 year ago Uncontrolled diabetes mellitus with hyperglycemia, with long-term current use of insulin ([address]    [address] [facility], Cisco [place], [place]    Office Visit    1 year ago Uncontrolled type 2 diabetes mellitus with hyperglycemia, without long-term current use of insulin ([address]    [employer], [address], [facility], [facility]    Office Visit                        pantoprazole 40 MG Oral Tab EC 90 tablet 3     Sig: Take 1 tablet (40 mg total) by mouth every morning before breakfast. To help with stomach acid and stomach irritation/inflammation       Gastrointestional Medication Protocol Passed - 5/18/2023  2:59 PM        Passed - In person appointment or virtual visit in the past 12 mos or appointment in next 3 mos     Recent Outpatient Visits              1 week ago Uncontrolled diabetes mellitus with hyperglycemia, with long-term current use of insulin ([address]    [employer], [address], [address], [place], [place]    Office Visit    6 months ago Adult general medical exam    [address], [address], [place], [place]    Office Visit    8 months ago Tension headache    [address], [address], [place], [place]    Office Visit    1 year ago Uncontrolled diabetes mellitus with hyperglycemia, with long-term current use of insulin ([address]    [address], Cisco W[place], [facility]    Office Visit    1 year ago Uncontrolled type 2 diabetes mellitus with hyperglycemia, without long-term current use of insulin ([address]    [employer], [address], [facility], APR    Office Visit                                Recent Outpatient Visits              1 week ago Uncontrolled diabetes mellitus with hyperglycemia, with long-term current use of insulin ([address]    [address], [address], [place], [place]    Office Visit    6 months ago Adult general medical exam    [address] [facility], [address], [place], [place]    Office Visit    8 months ago Tension headache    [address] [facility], [address], [place], [place]    Office Visit    1 year ago Uncontrolled diabetes mellitus with hyperglycemia, with long-term current use of insulin ([facility]    [address], [address], Becky [place], [place]    Office Visit    1 year ago Uncontrolled type 2 diabetes mellitus with hyperglycemia, without long-term current use of insulin ([facility]    [address], [address], Sergio Kruse, DARYL    Office Visit

## 2023-05-31 ENCOUNTER — TELEPHONE (OUTPATIENT)
Dept: FAMILY MEDICINE CLINIC | Facility: CLINIC | Age: 28
End: 2023-05-31

## 2023-05-31 DIAGNOSIS — Z79.4 UNCONTROLLED DIABETES MELLITUS WITH HYPERGLYCEMIA, WITH LONG-TERM CURRENT USE OF INSULIN (HCC): Primary | ICD-10-CM

## 2023-05-31 DIAGNOSIS — E11.65 UNCONTROLLED DIABETES MELLITUS WITH HYPERGLYCEMIA, WITH LONG-TERM CURRENT USE OF INSULIN (HCC): Primary | ICD-10-CM

## 2023-06-01 RX ORDER — INSULIN LISPRO 100 [IU]/ML
32 INJECTION, SOLUTION INTRAVENOUS; SUBCUTANEOUS
Qty: 10 ML | Refills: 1 | Status: SHIPPED | OUTPATIENT
Start: 2023-06-01

## 2023-06-01 NOTE — TELEPHONE ENCOUNTER
Patient (name and  verified) calling back and was provided with message regarding Rx. Verbalized understanding.

## 2023-06-24 DIAGNOSIS — Z79.4 UNCONTROLLED DIABETES MELLITUS WITH HYPERGLYCEMIA, WITH LONG-TERM CURRENT USE OF INSULIN (HCC): ICD-10-CM

## 2023-06-24 DIAGNOSIS — E11.65 UNCONTROLLED DIABETES MELLITUS WITH HYPERGLYCEMIA, WITH LONG-TERM CURRENT USE OF INSULIN (HCC): ICD-10-CM

## 2023-06-28 RX ORDER — INSULIN LISPRO 100 [IU]/ML
32 INJECTION, SOLUTION INTRAVENOUS; SUBCUTANEOUS
Qty: 87 ML | Refills: 0 | Status: SHIPPED | OUTPATIENT
Start: 2023-06-28

## 2023-08-02 ENCOUNTER — TELEPHONE (OUTPATIENT)
Dept: FAMILY MEDICINE CLINIC | Facility: CLINIC | Age: 28
End: 2023-08-02

## 2023-08-02 NOTE — TELEPHONE ENCOUNTER
Spoke with pt's wife per ETHEL VALLES verified, she is returning our call. She was  informed of below recommendation, she stated understanding and she will schedule on line for pt.        EDDIE

## 2023-10-23 DIAGNOSIS — N48.1 BALANITIS: ICD-10-CM

## 2023-10-24 RX ORDER — NYSTATIN 100000 U/G
CREAM TOPICAL
Qty: 30 G | Refills: 0 | Status: CANCELLED | OUTPATIENT
Start: 2023-10-24

## 2023-10-24 RX ORDER — NYSTATIN 100000 U/G
CREAM TOPICAL
Qty: 30 G | Refills: 0 | OUTPATIENT
Start: 2023-10-24

## 2023-10-24 NOTE — TELEPHONE ENCOUNTER
Pt is overdue for physical and labs. No additional refills will be given to patient. Please assist pt in making appointment for physical.

## 2023-10-24 NOTE — TELEPHONE ENCOUNTER
Pt is overdue for physical and labs. No a refills will be given to patient. Please assist pt in making appointment for physical.

## 2023-10-24 NOTE — TELEPHONE ENCOUNTER
Please review; protocol failed.   Requested Prescriptions   Pending Prescriptions Disp Refills    nystatin 100,000 Units/g External Cream 30 g 0     Sig: APPLY TOPICALLY TWICE DAILY TO THE RASH FOR 10-14 DAYS       There is no refill protocol information for this order        Recent Outpatient Visits              5 months ago Uncontrolled diabetes mellitus with hyperglycemia, with long-term current use of insulin ([facility]    [facility] Calvin Nowak,     Office Visit    1 year ago Adult general medical exam    [facility] Calvin Nowak,     Office Visit    1 year ago Tension headache    [facility] Calvin Nowak,     Office Visit    1 year ago Uncontrolled diabetes mellitus with hyperglycemia, with long-term current use of insulin ([facility])    [facility] Jorge Daley, DPM    Office Visit    1 year ago Uncontrolled type 2 diabetes mellitus with hyperglycemia, without long-term current use of insulin ([facility])    [facility] Martha Gomez APRN    Office Visit

## 2023-10-24 NOTE — TELEPHONE ENCOUNTER
Shweta Shelton, APRN         10/24/23  1:26 PM  Note  Pt is overdue for physical and labs. No a refills will be given to patient. Please assist pt in making appointment for physical.

## 2023-10-24 NOTE — TELEPHONE ENCOUNTER
Wife Hattie ([facility])  called back, informed about SHEY Scott, note below.   States that she will schedule an appointment but requesting if provider can just refill it until the appointment. Would like to use [facility] pharmacy .     Was seen on 5/8/23 with Dr Nowak due to DM .     No future appointments.

## 2023-10-25 DIAGNOSIS — Z79.4 UNCONTROLLED DIABETES MELLITUS WITH HYPERGLYCEMIA, WITH LONG-TERM CURRENT USE OF INSULIN (HCC): ICD-10-CM

## 2023-10-25 DIAGNOSIS — E11.65 UNCONTROLLED DIABETES MELLITUS WITH HYPERGLYCEMIA, WITH LONG-TERM CURRENT USE OF INSULIN (HCC): ICD-10-CM

## 2023-10-25 NOTE — TELEPHONE ENCOUNTER
Current Outpatient Medications:     HUMALOG 100 UNIT/ML Injection Solution, Inject 32 Units into the skin 3 (three) times daily before meals. , Disp: 87 mL, Rfl: 0

## 2023-10-26 NOTE — TELEPHONE ENCOUNTER
Please review. Protocol failed / Has no protocol. Requested Prescriptions   Pending Prescriptions Disp Refills    HUMALOG 100 UNIT/ML Injection Solution 87 mL 0     Sig: Inject 32 Units into the skin 3 (three) times daily before meals.        Diabetes Medication Protocol Failed - 10/25/2023 11:00 AM        Failed - Last A1C < 7.5 and within past 6 months     Lab Results   Component Value Date    A1C 10.8 (H) 05/08/2023             Passed - In person appointment or virtual visit in the past 6 mos or appointment in next 3 mos     Recent Outpatient Visits              5 months ago Uncontrolled diabetes mellitus with hyperglycemia, with long-term current use of insulin ([facility]    [address], [address], [place] Iglesias [place], [place]    Office Visit    1 year ago Adult general medical exam    Ren Read [address], [place], [place]    Office Visit    1 year ago Tension headache    Ren Read, [address], [place], [place]    Office Visit    1 year ago Uncontrolled diabetes mellitus with hyperglycemia, with long-term current use of insulin ([facility]    [address], [address], [place], [place]    Office Visit    1 year ago Uncontrolled type 2 diabetes mellitus with hyperglycemia, without long-term current use of insulin ([facility]    [address], [address], [facility]    Office Visit          Future Appointments         Provider Department Appt Notes    In 5 days [address], Louie Yearly physical - last was 10-24-22  medication refills                      Passed - EGFRCR or GFRNAA > 50     GFR Evaluation  EGFRCR: 133 , resulted on 5/8/2023          Passed - GFR in the past 12 months           Future Appointments         Provider Department Appt Notes    In 5 days Esha Desai, [address], [address], [place] Yearly physical - last was 10-24-22  medication refills           Recent Outpatient Visits              5 months ago Uncontrolled diabetes mellitus with hyperglycemia, with long-term current use of insulin ([facility]    [address] [facility], [place]ad Velas[place], DO    Office Visit    1 year ago Adult general medical exam    [address] [facility], [address], [place], [place]    Office Visit    1 year ago Tension headache    [address] [facility], [address], [place], [place]    Office Visit    1 year ago Uncontrolled diabetes mellitus with hyperglycemia, with long-term current use of insulin ([address]    [address], [address], [place] Worthy [facility]    Office Visit    1 year ago Uncontrolled type 2 diabetes mellitus with hyperglycemia, without long-term current use of insulin ([address]    [address], [address], Sergio Kruse, APRN    Office Visit

## 2023-10-27 RX ORDER — INSULIN LISPRO 100 [IU]/ML
32 INJECTION, SOLUTION INTRAVENOUS; SUBCUTANEOUS
Qty: 87 ML | Refills: 0 | Status: SHIPPED | OUTPATIENT
Start: 2023-10-27

## 2023-10-31 ENCOUNTER — OFFICE VISIT (OUTPATIENT)
Dept: FAMILY MEDICINE CLINIC | Facility: CLINIC | Age: 28
End: 2023-10-31

## 2023-10-31 VITALS
SYSTOLIC BLOOD PRESSURE: 124 MMHG | HEIGHT: 68 IN | HEART RATE: 87 BPM | DIASTOLIC BLOOD PRESSURE: 76 MMHG | BODY MASS INDEX: 41.8 KG/M2 | WEIGHT: 275.81 LBS

## 2023-10-31 DIAGNOSIS — E78.2 MIXED HYPERLIPIDEMIA: ICD-10-CM

## 2023-10-31 DIAGNOSIS — E11.65 UNCONTROLLED DIABETES MELLITUS WITH HYPERGLYCEMIA, WITH LONG-TERM CURRENT USE OF INSULIN (HCC): ICD-10-CM

## 2023-10-31 DIAGNOSIS — Z79.4 UNCONTROLLED DIABETES MELLITUS WITH HYPERGLYCEMIA, WITH LONG-TERM CURRENT USE OF INSULIN (HCC): ICD-10-CM

## 2023-10-31 DIAGNOSIS — Z00.00 WELL ADULT EXAM: Primary | ICD-10-CM

## 2023-10-31 PROCEDURE — 3074F SYST BP LT 130 MM HG: CPT | Performed by: NURSE PRACTITIONER

## 2023-10-31 PROCEDURE — 3078F DIAST BP <80 MM HG: CPT | Performed by: NURSE PRACTITIONER

## 2023-10-31 PROCEDURE — 99395 PREV VISIT EST AGE 18-39: CPT | Performed by: NURSE PRACTITIONER

## 2023-10-31 PROCEDURE — 90471 IMMUNIZATION ADMIN: CPT | Performed by: NURSE PRACTITIONER

## 2023-10-31 PROCEDURE — 90686 IIV4 VACC NO PRSV 0.5 ML IM: CPT | Performed by: NURSE PRACTITIONER

## 2023-10-31 PROCEDURE — 3008F BODY MASS INDEX DOCD: CPT | Performed by: NURSE PRACTITIONER

## 2023-11-06 ENCOUNTER — LAB ENCOUNTER (OUTPATIENT)
Dept: LAB | Age: 28
End: 2023-11-06
Attending: NURSE PRACTITIONER
Payer: COMMERCIAL

## 2023-11-06 DIAGNOSIS — E78.2 MIXED HYPERLIPIDEMIA: ICD-10-CM

## 2023-11-06 DIAGNOSIS — E11.65 UNCONTROLLED DIABETES MELLITUS WITH HYPERGLYCEMIA, WITH LONG-TERM CURRENT USE OF INSULIN (HCC): ICD-10-CM

## 2023-11-06 DIAGNOSIS — Z79.4 UNCONTROLLED DIABETES MELLITUS WITH HYPERGLYCEMIA, WITH LONG-TERM CURRENT USE OF INSULIN (HCC): ICD-10-CM

## 2023-11-06 DIAGNOSIS — Z00.00 WELL ADULT EXAM: ICD-10-CM

## 2023-11-06 LAB
ALBUMIN SERPL-MCNC: 4.3 G/DL (ref 3.2–4.8)
ALBUMIN/GLOB SERPL: 1.9 {RATIO} (ref 1–2)
ALP LIVER SERPL-CCNC: 74 U/L
ALT SERPL-CCNC: 55 U/L
ANION GAP SERPL CALC-SCNC: 6 MMOL/L (ref 0–18)
AST SERPL-CCNC: 23 U/L (ref ?–34)
BASOPHILS # BLD AUTO: 0.04 X10(3) UL (ref 0–0.2)
BASOPHILS NFR BLD AUTO: 0.5 %
BILIRUB SERPL-MCNC: 0.4 MG/DL (ref 0.3–1.2)
BUN BLD-MCNC: 16 MG/DL (ref 9–23)
BUN/CREAT SERPL: 23.9 (ref 10–20)
CALCIUM BLD-MCNC: 9 MG/DL (ref 8.7–10.4)
CHLORIDE SERPL-SCNC: 106 MMOL/L (ref 98–112)
CHOLEST SERPL-MCNC: 145 MG/DL (ref ?–200)
CO2 SERPL-SCNC: 26 MMOL/L (ref 21–32)
CREAT BLD-MCNC: 0.67 MG/DL
CREAT UR-SCNC: 214.3 MG/DL
DEPRECATED RDW RBC AUTO: 35.9 FL (ref 35.1–46.3)
EGFRCR SERPLBLD CKD-EPI 2021: 129 ML/MIN/1.73M2 (ref 60–?)
EOSINOPHIL # BLD AUTO: 0.16 X10(3) UL (ref 0–0.7)
EOSINOPHIL NFR BLD AUTO: 1.9 %
ERYTHROCYTE [DISTWIDTH] IN BLOOD BY AUTOMATED COUNT: 12.7 % (ref 11–15)
EST. AVERAGE GLUCOSE BLD GHB EST-MCNC: 275 MG/DL (ref 68–126)
FASTING PATIENT LIPID ANSWER: YES
FASTING STATUS PATIENT QL REPORTED: YES
GLOBULIN PLAS-MCNC: 2.3 G/DL (ref 2.8–4.4)
GLUCOSE BLD-MCNC: 194 MG/DL (ref 70–99)
HBA1C MFR BLD: 11.2 % (ref ?–5.7)
HCT VFR BLD AUTO: 44.9 %
HDLC SERPL-MCNC: 31 MG/DL (ref 40–59)
HGB BLD-MCNC: 15.4 G/DL
IMM GRANULOCYTES # BLD AUTO: 0.05 X10(3) UL (ref 0–1)
IMM GRANULOCYTES NFR BLD: 0.6 %
LDLC SERPL CALC-MCNC: 55 MG/DL (ref ?–100)
LYMPHOCYTES # BLD AUTO: 3.25 X10(3) UL (ref 1–4)
LYMPHOCYTES NFR BLD AUTO: 38.2 %
MCH RBC QN AUTO: 27 PG (ref 26–34)
MCHC RBC AUTO-ENTMCNC: 34.3 G/DL (ref 31–37)
MCV RBC AUTO: 78.8 FL
MICROALBUMIN UR-MCNC: 28.1 MG/DL
MICROALBUMIN/CREAT 24H UR-RTO: 131.1 UG/MG (ref ?–30)
MONOCYTES # BLD AUTO: 0.42 X10(3) UL (ref 0.1–1)
MONOCYTES NFR BLD AUTO: 4.9 %
NEUTROPHILS # BLD AUTO: 4.59 X10 (3) UL (ref 1.5–7.7)
NEUTROPHILS # BLD AUTO: 4.59 X10(3) UL (ref 1.5–7.7)
NEUTROPHILS NFR BLD AUTO: 53.9 %
NONHDLC SERPL-MCNC: 114 MG/DL (ref ?–130)
OSMOLALITY SERPL CALC.SUM OF ELEC: 292 MOSM/KG (ref 275–295)
PLATELET # BLD AUTO: 208 10(3)UL (ref 150–450)
POTASSIUM SERPL-SCNC: 4 MMOL/L (ref 3.5–5.1)
PROT SERPL-MCNC: 6.6 G/DL (ref 5.7–8.2)
RBC # BLD AUTO: 5.7 X10(6)UL
SODIUM SERPL-SCNC: 138 MMOL/L (ref 136–145)
TRIGL SERPL-MCNC: 386 MG/DL (ref 30–149)
TSI SER-ACNC: 0.83 MIU/ML (ref 0.55–4.78)
VLDLC SERPL CALC-MCNC: 56 MG/DL (ref 0–30)
WBC # BLD AUTO: 8.5 X10(3) UL (ref 4–11)

## 2023-11-06 PROCEDURE — 82043 UR ALBUMIN QUANTITATIVE: CPT

## 2023-11-06 PROCEDURE — 84443 ASSAY THYROID STIM HORMONE: CPT

## 2023-11-06 PROCEDURE — 85025 COMPLETE CBC W/AUTO DIFF WBC: CPT

## 2023-11-06 PROCEDURE — 80053 COMPREHEN METABOLIC PANEL: CPT

## 2023-11-06 PROCEDURE — 82570 ASSAY OF URINE CREATININE: CPT

## 2023-11-06 PROCEDURE — 83036 HEMOGLOBIN GLYCOSYLATED A1C: CPT

## 2023-11-06 PROCEDURE — 80061 LIPID PANEL: CPT

## 2023-11-06 PROCEDURE — 36415 COLL VENOUS BLD VENIPUNCTURE: CPT

## 2023-11-24 DIAGNOSIS — N48.1 BALANITIS: ICD-10-CM

## 2023-11-24 NOTE — TELEPHONE ENCOUNTER
Please review refill failed/no protocol     Per previous visit:  Needs to go through all of his medications and get refilled. He states that the balanitis happens if he does not take care of himself and wash himself and dry himself so he would like more nystatin cream that he can use periodically. He is got the acne keloidalis in the back of the neck that he uses triamcinolone cream for. Annual Physical Never done     Requested Prescriptions     Pending Prescriptions Disp Refills    nystatin 100,000 Units/g External Cream 30 g 0     Sig: APPLY TOPICALLY TWICE DAILY TO THE RASH FOR 10-14 DAYS         Recent Visits  Date Type Provider Dept   10/31/23 Office Visit DARYL Kahn [facility]   05/08/23 Office Visit Pam Finley,  [facility]   10/24/22 Office Visit Pam Finley, DO [facility]   08/27/22 Office Visit Pammargaret Finley, DO [facility]   Showing recent visits within past 540 days with a meds authorizing provider and meeting all other requirements  Future Appointments  No visits were found meeting these conditions.   Showing future appointments within next 150 days with a meds authorizing provider and meeting all other requirements    Requested Prescriptions   Pending Prescriptions Disp Refills    nystatin 100,000 Units/g External Cream 30 g 0     Sig: APPLY TOPICALLY TWICE DAILY TO THE RASH FOR 10-14 DAYS       There is no refill protocol information for this order

## 2023-11-25 NOTE — TELEPHONE ENCOUNTER
Last physical 10/31/23 with Lashon Murry. RN re sent it to [place]  for approval.     COPIED AND PASTED 11/24/23 Apiphany message from the patient ;    Jarad Barcenas Em Triage Support (supporting DARYL Gee)29 minutes ago (6:15 PM)       Why???  Please explain why I have to call to speak to a nurse  When sending messages like this is the same thing   Kalpana already got 2 different response from 2 nurses about this and none of them are helping. By any chance are these messages really being seen by the Doctor????       From: Ricardo Kraus  To: Francisco jamison  Sent: 11/24/2023  7:46 AM CST  Subject: Refill request sent    Good morning I know need to see the endo doctor but can you help me with the refill cream for my rash my appointment is DECEMBER 1st    Thank you

## 2023-11-26 RX ORDER — NYSTATIN 100000 U/G
CREAM TOPICAL
Qty: 30 G | Refills: 1 | Status: SHIPPED | OUTPATIENT
Start: 2023-11-26

## 2023-12-01 ENCOUNTER — TELEMEDICINE (OUTPATIENT)
Dept: ENDOCRINOLOGY CLINIC | Facility: CLINIC | Age: 28
End: 2023-12-01
Payer: COMMERCIAL

## 2023-12-01 DIAGNOSIS — E11.65 UNCONTROLLED TYPE 2 DIABETES MELLITUS WITH HYPERGLYCEMIA, WITH LONG-TERM CURRENT USE OF INSULIN (HCC): Primary | ICD-10-CM

## 2023-12-01 DIAGNOSIS — Z79.4 UNCONTROLLED TYPE 2 DIABETES MELLITUS WITH HYPERGLYCEMIA, WITH LONG-TERM CURRENT USE OF INSULIN (HCC): Primary | ICD-10-CM

## 2023-12-01 PROCEDURE — 99214 OFFICE O/P EST MOD 30 MIN: CPT | Performed by: NURSE PRACTITIONER

## 2023-12-01 NOTE — PATIENT INSTRUCTIONS
Medications:   continue with Metformin 1,000mg twice daily   - start Mounjaro 2.5mg weekly for 4 weeks    - week 5: increase Mounjaro 5mg weekly   -continue with Lantus 50 units once daily in AM  -start taking Humalog consistently       25 units with breakfast       25 units with lunch      25 units with dinner

## 2023-12-06 ENCOUNTER — PATIENT MESSAGE (OUTPATIENT)
Dept: OTHER | Age: 28
End: 2023-12-06

## 2023-12-07 NOTE — TELEPHONE ENCOUNTER
Updated medication list.     51 Nayla Rizzo (supporting You)21 hours ago (8:24 PM)     Yes I told my provider Elvin Mar that I stopped taking it because it gave me a reddish rash over my body and it was irritating

## 2023-12-07 NOTE — TELEPHONE ENCOUNTER
From: Adrian Naik  To: Vj Ansari  Sent: 12/6/2023 8:20 PM CST  Subject: Medication list    Ulices Emigdio,     We have received your request to remove niacin 500 MG Oral Tab from your medication list. We appreciate you taking an active role in managing your medical record. As you are surely aware, it is very important that the medication list be 100% accurate. Prior to removing the medication, we want to be sure that you are no longer taking or in need of this medication. Please reply to this message to confirm this information. It would be helpful to know the reason the medication was stopped and /or if you were advised by someone to stop the medication. Thank you for using [facility].

## 2023-12-18 ENCOUNTER — PATIENT MESSAGE (OUTPATIENT)
Dept: ENDOCRINOLOGY CLINIC | Facility: CLINIC | Age: 28
End: 2023-12-18

## 2023-12-18 NOTE — TELEPHONE ENCOUNTER
Sen Zepeda,    Please see below. It does not look like an appointment was scheduled with me. Would be happy to see pt or if you would like to refer to [facility] for initial diet/follow up?

## 2023-12-20 ENCOUNTER — MED REC SCAN ONLY (OUTPATIENT)
Dept: ENDOCRINOLOGY CLINIC | Facility: CLINIC | Age: 28
End: 2023-12-20

## 2023-12-20 ENCOUNTER — PATIENT MESSAGE (OUTPATIENT)
Dept: ENDOCRINOLOGY CLINIC | Facility: CLINIC | Age: 28
End: 2023-12-20

## 2023-12-20 NOTE — TELEPHONE ENCOUNTER
Unsure why this apt was not scheduled, but yes please schedule apt with patient. This can be done as a telephone visit if his CGM is connected with clinic to avoid further inconvenience to patient. Thank you!

## 2023-12-21 RX ORDER — INSULIN LISPRO 100 [IU]/ML
25 INJECTION, SOLUTION INTRAVENOUS; SUBCUTANEOUS
Qty: 69 ML | Refills: 0 | Status: SHIPPED | OUTPATIENT
Start: 2023-12-21

## 2023-12-21 NOTE — TELEPHONE ENCOUNTER
From: Eron Staff  To:  Federico Arredondo  Sent: 12/20/2023 2:10 PM CST  Subject: Humalog    Hello    Sorry for the inconvenience but we have a new problem now starting January 2024 humalog will no longer be covered by my insurance instead they will cover Insulin Lispro vial (unbranded Humalog)  Please make the change thank you    See attachment

## 2023-12-29 DIAGNOSIS — Z79.4 UNCONTROLLED DIABETES MELLITUS WITH HYPERGLYCEMIA, WITH LONG-TERM CURRENT USE OF INSULIN (HCC): ICD-10-CM

## 2023-12-29 DIAGNOSIS — E11.65 UNCONTROLLED DIABETES MELLITUS WITH HYPERGLYCEMIA, WITH LONG-TERM CURRENT USE OF INSULIN (HCC): ICD-10-CM

## 2023-12-30 RX ORDER — INSULIN LISPRO 100 [IU]/ML
INJECTION, SOLUTION INTRAVENOUS; SUBCUTANEOUS
Qty: 87 ML | Refills: 0 | OUTPATIENT
Start: 2023-12-30

## 2023-12-30 NOTE — TELEPHONE ENCOUNTER
Discontinued 12/21 12/21/23 1145 Discontinue DARYL Medina Reason:Stop This Medication      Provider Information    Authorizing Provider Encounter Provider   DO Eliu Teresa Vineet, DO     Medication Detail    Medication Quantity Refills Start End   HUMALOG 100 UNIT/ML Injection Solution (Discontinued) 87 mL 0 10/27/2023 12/21/2023   Sig:   Inject 32 Units into the skin 3 (three) times daily before meals.      Route:   Subcutaneous     THOMAS:   Yes     Reason for Discontinue:   Stop This Medicatio

## 2024-02-25 RX ORDER — INSULIN LISPRO 100 [IU]/ML
INJECTION, SOLUTION INTRAVENOUS; SUBCUTANEOUS
Qty: 69 ML | Refills: 0 | Status: SHIPPED | OUTPATIENT
Start: 2024-02-25

## 2024-03-23 NOTE — TELEPHONE ENCOUNTER
LOV: 12/01/2023     Next office visit: 04/23/2024     Last filled: no date    RTC in 3 months        Order pended and routed to provider

## 2024-03-24 RX ORDER — INSULIN GLARGINE 100 [IU]/ML
50 INJECTION, SOLUTION SUBCUTANEOUS DAILY
Qty: 45 ML | Refills: 0 | Status: SHIPPED | OUTPATIENT
Start: 2024-03-24

## 2024-03-27 ENCOUNTER — TELEPHONE (OUTPATIENT)
Dept: FAMILY MEDICINE CLINIC | Facility: CLINIC | Age: 29
End: 2024-03-27

## 2024-03-27 DIAGNOSIS — E11.65 UNCONTROLLED DIABETES MELLITUS WITH HYPERGLYCEMIA, WITH LONG-TERM CURRENT USE OF INSULIN (HCC): Primary | ICD-10-CM

## 2024-03-27 DIAGNOSIS — Z79.4 UNCONTROLLED DIABETES MELLITUS WITH HYPERGLYCEMIA, WITH LONG-TERM CURRENT USE OF INSULIN (HCC): Primary | ICD-10-CM

## 2024-03-27 NOTE — TELEPHONE ENCOUNTER
Noted.       Future Appointments   Date Time Provider Department Center   4/1/2024  2:00 PM [facility]   4/23/2024  2:30 PM Martha Gomez APRN [facility]   5/21/2024  2:00 PM Long Watson MD [facility]

## 2024-04-01 ENCOUNTER — NURSE ONLY (OUTPATIENT)
Dept: INTERNAL MEDICINE CLINIC | Facility: CLINIC | Age: 29
End: 2024-04-01
Payer: COMMERCIAL

## 2024-04-01 DIAGNOSIS — Z79.4 UNCONTROLLED DIABETES MELLITUS WITH HYPERGLYCEMIA, WITH LONG-TERM CURRENT USE OF INSULIN (HCC): ICD-10-CM

## 2024-04-01 DIAGNOSIS — E11.65 UNCONTROLLED DIABETES MELLITUS WITH HYPERGLYCEMIA, WITH LONG-TERM CURRENT USE OF INSULIN (HCC): ICD-10-CM

## 2024-04-01 PROCEDURE — 92229 IMG RTA DETC/MNTR DS POC ALY: CPT | Performed by: FAMILY MEDICINE

## 2024-05-14 RX ORDER — TIRZEPATIDE 5 MG/.5ML
5 INJECTION, SOLUTION SUBCUTANEOUS WEEKLY
Qty: 12 ML | Refills: 0 | Status: SHIPPED | OUTPATIENT
Start: 2024-05-14

## 2024-06-21 ENCOUNTER — TELEPHONE (OUTPATIENT)
Dept: ENDOCRINOLOGY CLINIC | Facility: CLINIC | Age: 29
End: 2024-06-21

## 2024-06-21 NOTE — TELEPHONE ENCOUNTER
Pt appt today rescheduled to 7/16 due to provider cancelling clinic- pt asking if needs to be fasting for labs prior- pt concerned about appt time (1:45) and having to fast for long time - pl call pt

## 2024-07-13 DIAGNOSIS — E11.65 UNCONTROLLED TYPE 2 DIABETES MELLITUS WITH HYPERGLYCEMIA, WITH LONG-TERM CURRENT USE OF INSULIN (HCC): Primary | ICD-10-CM

## 2024-07-13 DIAGNOSIS — Z79.4 UNCONTROLLED TYPE 2 DIABETES MELLITUS WITH HYPERGLYCEMIA, WITH LONG-TERM CURRENT USE OF INSULIN (HCC): Primary | ICD-10-CM

## 2024-07-15 RX ORDER — INSULIN GLARGINE 100 [IU]/ML
60 INJECTION, SOLUTION SUBCUTANEOUS DAILY
Qty: 54 ML | Refills: 0 | Status: SHIPPED | OUTPATIENT
Start: 2024-07-15 | End: 2024-10-13

## 2024-07-15 RX ORDER — INSULIN LISPRO 100 [IU]/ML
INJECTION, SOLUTION INTRAVENOUS; SUBCUTANEOUS
Qty: 65 ML | Refills: 0 | Status: SHIPPED | OUTPATIENT
Start: 2024-07-15

## 2024-07-15 NOTE — TELEPHONE ENCOUNTER
DARYL Cobian,  Patient has apt tomorrow - A1c too high for protocol -RX pended for approval -  sent reminding patient of apt - thanks    Endocrine refill protocol for basal insulins     Protocol Criteria: FAILED FOR LANTUS  - Appointment with Endocrinology completed in the last 6 months or scheduled in the next 3 months    - A1c result completed in the last 6 months and is below 8.5%     Verify appointment has been completed or scheduled in the appropriate timeline. If so can send a 90 day supply with 1 refill per provider protocol.    Verify A1c has been completed within the last 6 months and is below 8.5%     Last completed office visit:12/1/2023 Martha Gomez APRN   Next scheduled Follow up:   Future Appointments   Date Time Provider Department Center   7/16/2024  1:45 PM Isufi, Marvina, APRN [facility]      Last A1c result: Last A1c value was 11.2% done 11/6/2023.

## 2024-07-16 ENCOUNTER — OFFICE VISIT (OUTPATIENT)
Dept: ENDOCRINOLOGY CLINIC | Facility: CLINIC | Age: 29
End: 2024-07-16

## 2024-07-16 VITALS
BODY MASS INDEX: 41.22 KG/M2 | HEIGHT: 68 IN | HEART RATE: 103 BPM | DIASTOLIC BLOOD PRESSURE: 71 MMHG | WEIGHT: 272 LBS | SYSTOLIC BLOOD PRESSURE: 126 MMHG

## 2024-07-16 DIAGNOSIS — E11.65 UNCONTROLLED TYPE 2 DIABETES MELLITUS WITH HYPERGLYCEMIA, WITH LONG-TERM CURRENT USE OF INSULIN (HCC): Primary | ICD-10-CM

## 2024-07-16 DIAGNOSIS — Z79.4 UNCONTROLLED TYPE 2 DIABETES MELLITUS WITH HYPERGLYCEMIA, WITH LONG-TERM CURRENT USE OF INSULIN (HCC): Primary | ICD-10-CM

## 2024-07-16 LAB
CARTRIDGE EXPIRATION DATE: ABNORMAL DATE
GLUCOSE BLOOD: 247
HEMOGLOBIN A1C: 10.4 % (ref 4.3–5.6)
TEST STRIP EXPIRATION DATE: NORMAL DATE
TEST STRIP LOT #: NORMAL NUMERIC

## 2024-07-16 PROCEDURE — 83036 HEMOGLOBIN GLYCOSYLATED A1C: CPT | Performed by: NURSE PRACTITIONER

## 2024-07-16 PROCEDURE — 99214 OFFICE O/P EST MOD 30 MIN: CPT | Performed by: NURSE PRACTITIONER

## 2024-07-16 PROCEDURE — 82947 ASSAY GLUCOSE BLOOD QUANT: CPT | Performed by: NURSE PRACTITIONER

## 2024-07-16 RX ORDER — TIRZEPATIDE 10 MG/.5ML
10 INJECTION, SOLUTION SUBCUTANEOUS WEEKLY
Qty: 2 ML | Refills: 0 | Status: SHIPPED | OUTPATIENT
Start: 2024-07-30

## 2024-07-16 RX ORDER — TIRZEPATIDE 7.5 MG/.5ML
7.5 INJECTION, SOLUTION SUBCUTANEOUS WEEKLY
Qty: 2 ML | Refills: 0 | Status: SHIPPED | OUTPATIENT
Start: 2024-07-16

## 2024-07-16 NOTE — PROGRESS NOTES
Name: He Aguilera  Date: 7/16/2024    CHIEF COMPLAINT   Chief Complaint   Patient presents with    Diabetes     A1C check     HISTORY OF PRESENT ILLNESS   He Aguilera is a 29 year old male who presents for follow up on diabetes management.  HbA1C: 10.4% at POC today. Previously was 11.2% on 11/6/2023.   Blood glucose: 247 in clinic today.     FAMILY HISTORY OF DIABETES  -father, mother and maternal grandfather   DIABETES HISTORY  Diagnosed: 4 years ago - transitioned to LA insulin 3 years ago   Prior HbA, C or glycohemoglobin were 12.5% 10/26/2021; 11.2% 11/6/2023; 10.4% at POC today;     + Hospitalization with hyperglycemia 4 years ago when diagnosed   + pancreatitis in 5/2021    PREVIOUS MEDICATION FOR DM:  -Victoza-d/c'ed due to high cost  - Farxiga-does not remember taking  -ozempic -does not remember taking       CURRENT MEDICATIONS FOR DM:  Metformin 1,000mg twice daily   Mounjaro 5mg once weekly - tolerating well; denies GI s/e   Lantus 60 units once daily in AM  Humalog   25- 30 units with breakfast    25 -30 units with lunch   25 - 30 units with dinner     HOME GLUCOSE READINGS:   Works night shift: 12am to 12pm   12-1pm dinner: same as 5pm meal   5pm \"breakfast\": meat, rice, beans, tortillas and ICE drink  7-8am lunch: skips around 95% of the time    Cashew or orange or apples or rema or watermelon   - white bread sandwich - ham cheese and lettus/tomatoes   + water   + celcius   + ICE       BG readings   around 12pm: 110s-120s  11pm:: 210 highest (after weekend); lowest was 97    Weekdays: 150s- 160s  Hypoglycemia present: no - denies symptoms of hypoglycemia     HISTORY OF DIABETES COMPLICATIONS:  History of Retinopathy: no - last eye exam within the last 12 months: no   History of Neuropathy: no   History of Nephropathy: no     ASSOCIATED COMPLICATIONS:   HTN: yes   Hyperlipidemia: yes   Cardiovascular Disease: no   Peripheral Vascular Disease: no     DIETARY COMPLIANCE:  Fair compliance -- see above      EXERCISE:   No- however walking frequently     Polyuria, polyphagia, polydipsia: no   Paresthesias: no   Blurred vision: no   Recent steroids, illness or infections: no     REVIEW OF SYSTEMS  Constitutional: Negative for: weight change, fever, fatigue, cold/heat intolerance  Eyes: Negative for:  Visual changes, proptosis, blurring  ENT: Negative for:  dysphagia, neck swelling, dysphonia  Respiratory: Negative for: hemoptysis, shortness of breath, cough, or dyspnea.  Cardiovascular: Negative for:  chest pain, chest discomfort, palpitations  GI: Negative for:  abdominal pain, nausea, vomiting, diarrhea, heartburn, constipation  Neurology: Negative for: headache, dizziness, syncope, numbness/tingling, or weakness.   Genito-Urinary: Negative for: dysuria, frequency or hematuria   Hematology/Lymphatics: Negative for: bruising, easy bleeding, lower extremity edema  Skin: Negative for: rash, blister, infection or ulcers.  Endocrine: Negative for: polyuria, polydipsia. No osteoporosis. No thyroid disease.     MEDICATIONS:     Current Outpatient Medications:     LANTUS SOLOSTAR 100 UNIT/ML Subcutaneous Solution Pen-injector, Inject 60 Units into the skin daily., Disp: 54 mL, Rfl: 0    Insulin Lispro, 1 Unit Dial, 100 UNIT/ML Subcutaneous Solution Pen-injector, Inject 25 units with breakfast, 25 units with lunch and 22 units with dinner. Max daily dose 72 units., Disp: 65 mL, Rfl: 0    MOUNJARO 5 MG/0.5ML Subcutaneous Solution Pen-injector, INJECT 5MG SUBCUTANEOUSLY ONCE A WEEK, Disp: 12 mL, Rfl: 0    metFORMIN HCl 1000 MG Oral Tab, Take 1 tablet (1,000 mg total) by mouth 2 (two) times daily with meals., Disp: 180 tablet, Rfl: 3    ONETOUCH ULTRA In Vitro Strip, Check sugars twice daily and as needed., Disp: 100 each, Rfl: 11    insulin glargine 100 UNIT/ML Subcutaneous Solution, Inject 50 Units into the skin every morning., Disp: 50 mL, Rfl: 1    BD PEN NEEDLE MINI U/F 31G X 5 MM Does not apply Misc, , Disp: , Rfl:      nystatin 100,000 Units/g External Cream, APPLY TOPICALLY TWICE DAILY TO THE RASH FOR 10-14 DAYS, Disp: 30 g, Rfl: 1    rosuvastatin 20 MG Oral Tab, Take 1 tablet (20 mg total) by mouth nightly. For cholesterol., Disp: 90 tablet, Rfl: 3    ezetimibe 10 MG Oral Tab, Take 1 tablet (10 mg total) by mouth daily., Disp: 90 tablet, Rfl: 3    Fenofibrate 160 MG Oral Tab, Take 1 tablet (160 mg total) by mouth daily., Disp: 90 tablet, Rfl: 3    lisinopril 5 MG Oral Tab, Take 1 tablet (5 mg total) by mouth daily., Disp: 90 tablet, Rfl: 3    triamcinolone 0.1 % External Cream, Apply 1 Application topically 2 (two) times daily as needed., Disp: 60 g, Rfl: 0    omega-3 fatty acids 1000 MG Oral Cap, Take 2,000 mg by mouth 2 (two) times daily with meals., Disp: , Rfl:     ALLERGIES:   Allergies   Allergen Reactions    Niacin ITCHING       SOCIAL HISTORY:   Social History     Socioeconomic History    Marital status:    Tobacco Use    Smoking status: Every Day    Smokeless tobacco: Never   Vaping Use    Vaping status: Former   Substance and Sexual Activity    Alcohol use: Never    Drug use: Yes     Types: Cannabis     PAST MEDICAL HISTORY:   Past Medical History:    COVID-19    Diabetes (HCC)    Eruptive xanthoma    Essential hypertension    High blood pressure    High cholesterol    Hyperlipidemia    Hypertriglyceridemia    Pancreatitis (HCC)    Trigeminal neuralgia     PAST SURGICAL HISTORY:   Past Surgical History:   Procedure Laterality Date    Other      Testicle Surgery    Other surgical history       PHYSICAL EXAM:   Vitals:    07/16/24 1357   BP: 126/71   Pulse: 103   Weight: 272 lb (123.4 kg)   Height: 5' 8\" (1.727 m)     BMI:   Body mass index is 41.36 kg/m².  PHYSICAL EXAM  General Appearance:  alert, well developed, in no acute distress  Eyes:  normal conjunctivae, sclera., normal sclera and normal pupils  Throat/Neck: normal sound to voice.   Respiratory:  non-labored. no increased work of breathing.    Skin:   normal moisture and skin texture  Psychiatric:  oriented to time, self, and place    LABS: Pertinent labs reviewed    ASSESSMENT/PLAN:    -Reviewed with patient the pathogenesis of diabetes, clinical significance of A1c, and common complications such as: microvascular, macrovascular and diabetic ketoacidosis. Patient verbalizes understanding of the importance of glycemic control and the goals of therapy.   -Discussed with patient glucose targets ranges (Fasting  and post prandial <180).     1.Type 2 Diabetes Mellitus, uncontrolled, with long term insulin use   -LAB DATA  HbA,C: 10.4% today   a) Medications  continue with Metformin 1,000mg twice daily   - start Mounjaro 5mg weekly for 4 weeks, then increase Mounjaro 7.5mg weekly - Risks and benefits reviewed. Verbalizes understanding.  -continue with Lantus 50 units once daily   - increase Humalog  30-->34 units with 12pm meal                 30-->34 units with 5pm meal                   25- 30 units with 7am meal     - reviewed option of starting sglt-2 medication - Jardiance. Patient will think about this and let me know if he is interested in starting.   - reviewed option of using Freestyle aislinn 3 cgm and to see if health insurance covers this CGM better.   - discussed to enter entries with insulin amt injected or when eating meals on aislinn cgm.   -reviewed target goal BG readings and A1C  -reviewed when to call and notify me of abnormal BG readings.   -discussed that he give me an update on BG readings on Friday. Call sooner if he develops BG readings <80 or having readings persistently >180     b) Nephropathy: GFR: 129  and urine MA: 131.1 on 11/6/2023  c) UTD with optho - followed by Dr. Miranda   d) Foot exam:  will defer to next f/u visit   e) start using Freestyle aislinn 3 CGM. Rx sent to pharmacy. Sample was started in clinic today.   f) Life style changes reviewed     2. Blood Pressure Management   -on lisinopril 5mg once daily   - normotensive today      3.mixed hyperlipidemia   -LDL: 55 and Tri on 2023  - on rosuvastatin 20mg at bedtime and ezetimibe 10mg once daily, fenofibrate 160mg once daily  and fish oil 1,000mg       RTC in 2 months   Patient instructed to call sooner if they develop Blood glucose readings <75 and/or if they have readings persistently >200.     The risks and benefits of my recommendations, as well as other treatment options were discussed with the patient today. questions were also answered to the best of my knowledge. Patient verbalizes understanding of these issues and agrees to the plan.    2024  DARYL Shah

## 2024-07-16 NOTE — PROGRESS NOTES
Sample continuous glucose monitor, Libre3, placed on patient's left arm. Patient understands guidelines and usage of sensor. Patient's sensor connected to clinic and aware of follow up.

## 2024-07-16 NOTE — PATIENT INSTRUCTIONS
A1C: 10.4% today --> previously was 11.2% on 11/6/2023.   Blood glucose: 247 in clinic today    Medications:   - continue with Metformin 1,000mg twice daily   - Increase Mounjaro 5 --> 7.5mg once weekly for 4 weeks on Sundays    Week 5: increase Mounjaro to 10mg once weekly   - continue with Lantus 60 units once daily in the morning   - increase Humalog  30-->34 units with 12pm meal             30-->34 units with 5pm meal             25- 30 units with 7am meal       - let's think about starting Jardiance for diabetes, kidney and heart protection        Weight:  Wt Readings from Last 6 Encounters:   07/16/24 272 lb (123.4 kg)   10/31/23 275 lb 12.8 oz (125.1 kg)   05/08/23 269 lb (122 kg)   03/23/23 285 lb (129.3 kg)   10/24/22 259 lb 12.8 oz (117.8 kg)   08/27/22 256 lb (116.1 kg)     A1C goal:  <7.0%    Blood sugar testing:  Start using Freestyle aislinn 3 continuous glucometer     Blood sugar targets:  Before breakfast:  (preferably < 110)  Before meals: <150     Call for persistent blood sugars < 75 or > 200

## 2024-07-19 ENCOUNTER — TELEPHONE (OUTPATIENT)
Dept: ENDOCRINOLOGY CLINIC | Facility: CLINIC | Age: 29
End: 2024-07-19

## 2024-07-19 RX ORDER — BLOOD-GLUCOSE SENSOR
1 EACH MISCELLANEOUS
Qty: 6 EACH | Refills: 1 | Status: SHIPPED | OUTPATIENT
Start: 2024-07-19

## 2024-07-22 RX ORDER — EMPAGLIFLOZIN 25 MG/1
25 TABLET, FILM COATED ORAL DAILY
Qty: 90 TABLET | Refills: 0 | Status: SHIPPED | OUTPATIENT
Start: 2024-07-22

## 2024-07-23 NOTE — TELEPHONE ENCOUNTER
From: Martha Gomez  To: He Aguilera  Sent: 7/19/2024 9:29 AM CDT  Subject: new doses    Don Cutler,     As discussed over the phone, let's adjust your insulin doses to the following:    -increase Lantus 60 --> 70 units once daily     - decrease Humalog  30 --> 24 units with 12pm meal    35 units with 5pm meal    10 units with snacks overnight while at work ( please space out by 3-4hrs each injection)      Kind regards,   Aranza

## 2024-08-16 DIAGNOSIS — E11.65 UNCONTROLLED DIABETES MELLITUS WITH HYPERGLYCEMIA, WITH LONG-TERM CURRENT USE OF INSULIN (HCC): ICD-10-CM

## 2024-08-16 DIAGNOSIS — Z79.4 UNCONTROLLED DIABETES MELLITUS WITH HYPERGLYCEMIA, WITH LONG-TERM CURRENT USE OF INSULIN (HCC): ICD-10-CM

## 2024-08-16 NOTE — TELEPHONE ENCOUNTER
Endocrine Refill protocol for oral and injectable diabetic medications    Protocol Criteria:  FAILED A1c done 7/16/24 was 10.4    -Appointment with Endocrinology completed in the last 6 months or scheduled in the next 3 months    -A1c result below 8.5% in the past 6 months      Verify the above has been completed or scheduled in the appropriate timeline. If so can send a 90 day supply with 1 refill.     Last completed office visit: 7/16/2024 Martha Gomez APRN   Next scheduled Follow up:   Future Appointments   Date Time Provider Department Center   9/23/2024 10:00 AM Martha Gomez APRN [facility]      Last A1c result: Last A1c value was 10.4% done 7/16/2024.

## 2024-08-20 RX ORDER — LISINOPRIL 5 MG/1
5 TABLET ORAL DAILY
Qty: 90 TABLET | Refills: 3 | Status: SHIPPED | OUTPATIENT
Start: 2024-08-20

## 2024-08-20 NOTE — TELEPHONE ENCOUNTER
Refill passed per [facility] protocol.  Requested Prescriptions   Pending Prescriptions Disp Refills    LISINOPRIL 5 MG Oral Tab [Pharmacy Med Name: Lisinopril 5 MG Oral Tablet] 30 tablet 0     Sig: TAKE 1 TABLET (5 MG TOTAL) BY MOUTH ONCE DAILY       Hypertension Medications Protocol Passed - 8/16/2024 11:46 AM        Passed - CMP or BMP in past 12 months        Passed - Last BP reading less than 140/90     BP Readings from Last 1 Encounters:   07/16/24 126/71               Passed - In person appointment or virtual visit in the past 12 mos or appointment in next 3 mos     Recent Outpatient Visits              1 month ago Uncontrolled type 2 diabetes mellitus with hyperglycemia, with long-term current use of insulin ([facility])    [facility] Martha Gomez APRN    Office Visit    4 months ago Uncontrolled diabetes mellitus with hyperglycemia, with long-term current use of insulin ([facility])    [facility]    Nurse Only    8 months ago Uncontrolled type 2 diabetes mellitus with hyperglycemia, with long-term current use of insulin ([facility] [place] [place]    Telemedicine    9 months ago Well adult exam    [facility] Jannet Lam APRN    Office Visit    1 year ago Uncontrolled diabetes mellitus with hyperglycemia, with long-term current use of insulin ([facility])    [facility] Calvin Nowak DO    Office Visit          Future Appointments         Provider Department Appt Notes    In 1 month Martha Gomez APRN [facility] 2month dm follow up                    Passed - EGFRCR or GFRNAA > 50     GFR Evaluation  EGFRCR: 129 , resulted on 11/6/2023

## 2024-09-09 ENCOUNTER — TELEPHONE (OUTPATIENT)
Dept: ENDOCRINOLOGY CLINIC | Facility: CLINIC | Age: 29
End: 2024-09-09

## 2024-09-09 NOTE — TELEPHONE ENCOUNTER
Need for Clarification    Note to Prescriber:  Freestyle Nito 3 not available.  Please send RX for Freestyle Tracy 3 Plus.  Thank you    Current Outpatient Medications   Medication Sig Dispense Refill                         Continuous Glucose Sensor (FREESTYLE NITO 3 SENSOR) Does not apply Misc 1 each every 14 (fourteen) days. 6 each 1

## 2024-09-23 ENCOUNTER — LAB ENCOUNTER (OUTPATIENT)
Dept: LAB | Age: 29
End: 2024-09-23
Attending: NURSE PRACTITIONER
Payer: COMMERCIAL

## 2024-09-23 ENCOUNTER — TELEPHONE (OUTPATIENT)
Dept: ENDOCRINOLOGY CLINIC | Facility: CLINIC | Age: 29
End: 2024-09-23

## 2024-09-23 ENCOUNTER — OFFICE VISIT (OUTPATIENT)
Dept: ENDOCRINOLOGY CLINIC | Facility: CLINIC | Age: 29
End: 2024-09-23

## 2024-09-23 VITALS
HEART RATE: 94 BPM | DIASTOLIC BLOOD PRESSURE: 83 MMHG | WEIGHT: 266.63 LBS | SYSTOLIC BLOOD PRESSURE: 122 MMHG | BODY MASS INDEX: 41.85 KG/M2 | HEIGHT: 67 IN

## 2024-09-23 DIAGNOSIS — E11.9 TYPE 2 DIABETES MELLITUS WITHOUT COMPLICATION, WITH LONG-TERM CURRENT USE OF INSULIN (HCC): Primary | ICD-10-CM

## 2024-09-23 DIAGNOSIS — Z79.4 TYPE 2 DIABETES MELLITUS WITHOUT COMPLICATION, WITH LONG-TERM CURRENT USE OF INSULIN (HCC): ICD-10-CM

## 2024-09-23 DIAGNOSIS — Z79.4 TYPE 2 DIABETES MELLITUS WITHOUT COMPLICATION, WITH LONG-TERM CURRENT USE OF INSULIN (HCC): Primary | ICD-10-CM

## 2024-09-23 DIAGNOSIS — E11.9 TYPE 2 DIABETES MELLITUS WITHOUT COMPLICATION, WITH LONG-TERM CURRENT USE OF INSULIN (HCC): ICD-10-CM

## 2024-09-23 LAB
ALBUMIN SERPL-MCNC: 4.6 G/DL (ref 3.2–4.8)
ALBUMIN/GLOB SERPL: 1.6 {RATIO} (ref 1–2)
ALP LIVER SERPL-CCNC: 71 U/L
ALT SERPL-CCNC: 57 U/L
AMB EXT GMI: 7.5 %
ANION GAP SERPL CALC-SCNC: 8 MMOL/L (ref 0–18)
AST SERPL-CCNC: 27 U/L (ref ?–34)
BILIRUB SERPL-MCNC: 0.5 MG/DL (ref 0.3–1.2)
BUN BLD-MCNC: 15 MG/DL (ref 9–23)
BUN/CREAT SERPL: 21.4 (ref 10–20)
CALCIUM BLD-MCNC: 9.3 MG/DL (ref 8.7–10.4)
CHLORIDE SERPL-SCNC: 107 MMOL/L (ref 98–112)
CHOLEST SERPL-MCNC: 150 MG/DL (ref ?–200)
CO2 SERPL-SCNC: 27 MMOL/L (ref 21–32)
CREAT BLD-MCNC: 0.7 MG/DL
CREAT UR-SCNC: 102.7 MG/DL
EGFRCR SERPLBLD CKD-EPI 2021: 126 ML/MIN/1.73M2 (ref 60–?)
FASTING PATIENT LIPID ANSWER: YES
FASTING STATUS PATIENT QL REPORTED: YES
GLOBULIN PLAS-MCNC: 2.9 G/DL (ref 2–3.5)
GLUCOSE BLD-MCNC: 89 MG/DL (ref 70–99)
GLUCOSE BLOOD: 110
HDLC SERPL-MCNC: 29 MG/DL (ref 40–59)
HEMOGLOBIN A1C: 6.9 % (ref 4.3–5.6)
LDLC SERPL CALC-MCNC: 88 MG/DL (ref ?–100)
MICROALBUMIN UR-MCNC: 5.6 MG/DL
MICROALBUMIN/CREAT 24H UR-RTO: 54.5 UG/MG (ref ?–30)
NONHDLC SERPL-MCNC: 121 MG/DL (ref ?–130)
OSMOLALITY SERPL CALC.SUM OF ELEC: 294 MOSM/KG (ref 275–295)
POTASSIUM SERPL-SCNC: 3.9 MMOL/L (ref 3.5–5.1)
PROT SERPL-MCNC: 7.5 G/DL (ref 5.7–8.2)
SODIUM SERPL-SCNC: 142 MMOL/L (ref 136–145)
TEST STRIP LOT #: NORMAL NUMERIC
TRIGL SERPL-MCNC: 194 MG/DL (ref 30–149)
VLDLC SERPL CALC-MCNC: 31 MG/DL (ref 0–30)

## 2024-09-23 PROCEDURE — 82570 ASSAY OF URINE CREATININE: CPT

## 2024-09-23 PROCEDURE — 80061 LIPID PANEL: CPT

## 2024-09-23 PROCEDURE — 80053 COMPREHEN METABOLIC PANEL: CPT

## 2024-09-23 PROCEDURE — 82043 UR ALBUMIN QUANTITATIVE: CPT

## 2024-09-23 PROCEDURE — 36415 COLL VENOUS BLD VENIPUNCTURE: CPT

## 2024-09-23 RX ORDER — TIRZEPATIDE 12.5 MG/.5ML
12.5 INJECTION, SOLUTION SUBCUTANEOUS WEEKLY
Qty: 6 ML | Refills: 0 | Status: SHIPPED | OUTPATIENT
Start: 2024-09-23

## 2024-09-23 NOTE — PATIENT INSTRUCTIONS
A1C: 6.9% today --> previously from 10.4% on 7/16/2024  Blood glucose: 110 in clinic today    Medications:   - continue with Metformin 1,000mg twice daily   - continue with Jardiance 25mg once daily in AM   - continue with Mounjaro 10mg weekly for another 2 weeks   - continue with Lantus 70 units daily in AM  - continue with Humalog          30 units with 12pm meal          30 units with 5pm meal          10 units with snacks overnight (no insulin if not eating snacks)     Take an additional + 3 units with higher carb meals       In 2 weeks (10/13 James):  - continue with Metformin 1,000mg twice daily   - continue with Jardiance 25mg once daily in AM   - increase Mounjaro 10 --> 12.5mg weekly   - continue with Lantus 70 units daily in AM  - adjust Humalog          30 --> 26 units with 12pm meal          30 --> 26 units with 5pm meal          10 --> 4 units with snacks overnight (no insulin if not eating snacks)      - repeat annual labs       Weight:  Wt Readings from Last 6 Encounters:   09/23/24 266 lb 9.6 oz (120.9 kg)   07/16/24 272 lb (123.4 kg)   10/31/23 275 lb 12.8 oz (125.1 kg)   05/08/23 269 lb (122 kg)   03/23/23 285 lb (129.3 kg)   10/24/22 259 lb 12.8 oz (117.8 kg)     A1C goal:  <7.0%    Blood sugar testing:  Continue using Freestyle aislinn 3 continuous glucometer     Blood sugar targets:  Before breakfast:  (preferably < 110)  Before meals: <150     Call for persistent blood sugars < 75 or > 200

## 2024-09-23 NOTE — PROGRESS NOTES
Name: He Woodard  YOB: 1995  Report Period: 09/10/2024 - 09/23/2024 (14 days)  Generated: 09/23/2024  % Time CGM Active: 96%      Glucose Statistics and Targets  Average Glucose: 174 mg/dL  Glucose Management Indicator (GMI): 7.5%  Glucose Variability (%CV): 25.4%  Target Range: 70 - 180 mg/dL      Time in Ranges  Very High: >250 mg/dL --- 4%  High: 181 - 250 mg/dL --- 37%  Target Range: 70 - 180 mg/dL --- 59%  Low: 54 - 69 mg/dL --- 0%  Very Low: <54 mg/dL --- 0%

## 2024-09-23 NOTE — TELEPHONE ENCOUNTER
Endo staff: please obtain most recent progress note from Dr. Ruben Palma location Office: [phone]

## 2024-09-23 NOTE — PROGRESS NOTES
Name: He Aguilera  Date: 9/23/2024    CHIEF COMPLAINT   Chief Complaint   Patient presents with    Diabetes     Pt is here for follow up for diabetes and A1c check      HISTORY OF PRESENT ILLNESS   He Aguilera is a 29 year old male who presents for follow up on diabetes management.  HbA1C: 6.9% at POC today. Previously was 10.4% on 7/16/2024.   Blood glucose: 110 in clinic today.   Patient notes that he has been feeling well and denies any health changes or taking new medications since last office visit. He continues to follow a low carb diet and staying active per usual. He has been compliant with taking all diabetes medications.     FAMILY HISTORY OF DIABETES  -father, mother and maternal grandfather   DIABETES HISTORY  Diagnosed: 4 years ago - transitioned to [place] insulin 3 years ago   Prior HbA, C or glycohemoglobin were 12.5% 10/26/2021; 11.2% 11/6/2023; 10.4% 7/16/2024; 6.9% at POC today;     + Hospitalization with hyperglycemia 4 years ago when diagnosed   + pancreatitis in 5/2021    PREVIOUS MEDICATION FOR DM:  -Victoza-d/c'ed due to high cost  - Farxiga-does not remember taking  -ozempic -does not remember taking       CURRENT MEDICATIONS FOR DM:  Metformin 1,000mg twice daily   Jardiance 25mg once daily in AM - tolerating well; denies  s/e  Mounjaro 10mg once weekly on sundays- tolerating well; denies GI s/e; has taken 2 doses so far   Lantus 70 units once daily in AM  Humalog   18 units at 12pm meal -- has been taking 30 units   30 units with 5pm meal    14 units with snack overnight - has been taking 10 units intermittently     HOME GLUCOSE READINGS:   Works night shift: 12am to 12pm   Meal times are: 12pm, 5pm and 7-8am  Occasionally overnight eating snacks while at work     BG readings   Report Period: 09/10/2024 - 09/23/2024 (14 days)  Generated: 09/23/2024  % Time CGM Active: 96%     Glucose Statistics and Targets  Average Glucose: 174 mg/dL  Glucose Management Indicator (GMI): 7.5%  Glucose  Variability (%CV): 25.4%  Target Range: 70 - 180 mg/dL     Time in Ranges  Very High: >250 mg/dL --- 4%  High: 181 - 250 mg/dL --- 37%  Target Range: 70 - 180 mg/dL --- 59%  Low: 54 - 69 mg/dL --- 0%  Very Low: <54 mg/dL --- 0%    Continuous Glucose Monitoring Interpretation    He Aguilera has undergone continuous glucose monitoring with the personal Freestyle aislinn 3 continuous glucose monitor.  The blood glucose tracings were evaluated for two weeks prior to office visit. His blood glucose tracings demonstrated overall stable patterns with some occasional postprandial hyperglycemia. He did not experience any hypoglycemia during the week of evaluation.  As a result of his testing his medications were adjusted per below.     HISTORY OF DIABETES COMPLICATIONS:  History of Retinopathy: denies - last eye exam within the last 12 months: yes - followed by Dr. Miranda   History of Neuropathy: no   History of Nephropathy: no     ASSOCIATED COMPLICATIONS:   HTN: yes   Hyperlipidemia: yes   Cardiovascular Disease: no   Peripheral Vascular Disease: no     DIETARY COMPLIANCE:  Good; following a low carb diet     EXERCISE:   No- however walking frequently while at work     Polyuria, polyphagia, polydipsia: no   Paresthesias: no   Blurred vision: no   Recent steroids, illness or infections: no     REVIEW OF SYSTEMS  Constitutional: Negative for: weight change, fever, fatigue, cold/heat intolerance  Eyes: Negative for:  Visual changes, proptosis, blurring  ENT: Negative for:  dysphagia, neck swelling, dysphonia  Respiratory: Negative for: hemoptysis, shortness of breath, cough, or dyspnea.  Cardiovascular: Negative for:  chest pain, chest discomfort, palpitations  GI: Negative for:  abdominal pain, nausea, vomiting, diarrhea, heartburn, constipation  Neurology: Negative for: headache, dizziness, syncope, numbness/tingling, or weakness.   Genito-Urinary: Negative for: dysuria, frequency or hematuria   Hematology/Lymphatics:  Negative for: bruising, easy bleeding, lower extremity edema  Skin: Negative for: rash, blister, infection or ulcers.  Endocrine: Negative for: polyuria, polydipsia. No osteoporosis. No thyroid disease.     MEDICATIONS:     Current Outpatient Medications:     Continuous Glucose Sensor (FREESTYLE JONY 3 PLUS SENSOR) Does not apply Misc, 2 each every 30 (thirty) days. Change sensor every 15 days., Disp: 6 each, Rfl: 1    lisinopril 5 MG Oral Tab, Take 1 tablet (5 mg total) by mouth daily., Disp: 90 tablet, Rfl: 3    Tirzepatide (MOUNJARO) 7.5 MG/0.5ML Subcutaneous Solution Pen-injector, Inject 7.5 mg into the skin once a week., Disp: 2 mL, Rfl: 2    Empagliflozin (JARDIANCE) 25 MG Oral Tab, Take 25 mg by mouth daily., Disp: 90 tablet, Rfl: 0    Continuous Glucose Sensor (FREESTYLE JONY 3 SENSOR) Does not apply Misc, 1 each every 14 (fourteen) days., Disp: 6 each, Rfl: 1    Tirzepatide (MOUNJARO) 10 MG/0.5ML Subcutaneous Solution Pen-injector, Inject 10 mg into the skin once a week., Disp: 2 mL, Rfl: 0    LANTUS SOLOSTAR 100 UNIT/ML Subcutaneous Solution Pen-injector, Inject 60 Units into the skin daily., Disp: 54 mL, Rfl: 0    Insulin Lispro, 1 Unit Dial, 100 UNIT/ML Subcutaneous Solution Pen-injector, Inject 25 units with breakfast, 25 units with lunch and 22 units with dinner. Max daily dose 72 units., Disp: 65 mL, Rfl: 0    nystatin 100,000 Units/g External Cream, APPLY TOPICALLY TWICE DAILY TO THE RASH FOR 10-14 DAYS, Disp: 30 g, Rfl: 1    rosuvastatin 20 MG Oral Tab, Take 1 tablet (20 mg total) by mouth nightly. For cholesterol., Disp: 90 tablet, Rfl: 3    ezetimibe 10 MG Oral Tab, Take 1 tablet (10 mg total) by mouth daily., Disp: 90 tablet, Rfl: 3    metFORMIN HCl 1000 MG Oral Tab, Take 1 tablet (1,000 mg total) by mouth 2 (two) times daily with meals., Disp: 180 tablet, Rfl: 3    Fenofibrate 160 MG Oral Tab, Take 1 tablet (160 mg total) by mouth daily., Disp: 90 tablet, Rfl: 3    ONETOUCH ULTRA In Vitro  Strip, Check sugars twice daily and as needed., Disp: 100 each, Rfl: 11    triamcinolone 0.1 % External Cream, Apply 1 Application topically 2 (two) times daily as needed., Disp: 60 g, Rfl: 0    insulin glargine 100 UNIT/ML Subcutaneous Solution, Inject 50 Units into the skin every morning., Disp: 50 mL, Rfl: 1    BD PEN NEEDLE MINI U/F 31G X 5 MM Does not apply Misc, , Disp: , Rfl:     omega-3 fatty acids 1000 MG Oral Cap, Take 2,000 mg by mouth 2 (two) times daily with meals., Disp: , Rfl:     ALLERGIES:   Allergies   Allergen Reactions    Niacin ITCHING       SOCIAL HISTORY:   Social History     Socioeconomic History    Marital status:    Tobacco Use    Smoking status: Every Day    Smokeless tobacco: Never   Vaping Use    Vaping status: Former    Devices: Disposable   Substance and Sexual Activity    Alcohol use: Never    Drug use: Yes     Types: Cannabis     PAST MEDICAL HISTORY:   Past Medical History:    COVID-19    Diabetes (HCC)    Eruptive xanthoma    Essential hypertension    High blood pressure    High cholesterol    Hyperlipidemia    Hypertriglyceridemia    Pancreatitis (HCC)    Trigeminal neuralgia     PAST SURGICAL HISTORY:   Past Surgical History:   Procedure Laterality Date    Other      Testicle Surgery    Other surgical history       PHYSICAL EXAM:   Vitals:    09/23/24 0928   BP: 122/83   Pulse: 94   Weight: 266 lb 9.6 oz (120.9 kg)   Height: 5' 7\" (1.702 m)     BMI:   Body mass index is 41.76 kg/m².    PHYSICAL EXAM  General Appearance:  alert, well developed, in no acute distress  Eyes:  normal conjunctivae, sclera., normal sclera and normal pupils  Throat/Neck: normal sound to voice.   Respiratory:  non-labored. no increased work of breathing.    Skin:  normal moisture and skin texture  Psychiatric:  oriented to time, self, and place    Diabetes Foot Exam:  Bilateral barefoot skin diabetic exam is normal, visualized feet and the appearance is normal.  Bilateral monofilament/sensation of  both feet is normal.  Pulsation pedal pulse exam of both lower legs/feet is normal as well.    LABS: Pertinent labs reviewed    ASSESSMENT/PLAN:    -Reviewed with patient the pathogenesis of diabetes, clinical significance of A1c, and common complications such as: microvascular, macrovascular and diabetic ketoacidosis. Patient verbalizes understanding of the importance of glycemic control and the goals of therapy.   -Discussed with patient glucose targets ranges (Fasting  and post prandial <180).     1.Type 2 Diabetes Mellitus, controlled with long term insulin use   -LAB DATA  HbA,C: 6.9% today   a) Medications  - continue with Metformin 1,000mg twice daily   - continue with Jardiance 25mg once daily in AM   - continue with Mounjaro 10mg weekly for another 2 weeks   - continue with Lantus 70 units daily in AM  - continue with Humalog          30 units with 12pm meal          30 units with 5pm meal          10 units with snacks overnight (no insulin if not eating snacks)    + 3 units to base dose of Humalog if eating a higher carb meal     In 2 weeks (10/13 James):  - continue with Metformin 1,000mg twice daily   - continue with Jardiance 25mg once daily in AM   - increase Mounjaro 10 --> 12.5mg weekly - Risks and benefits reviewed. Verbalizes understanding.  - continue with Lantus 70 units daily in AM  - adjust Humalog          30 --> 26 units with 12pm meal          30 --> 26 units with 5pm meal          10 --> 4 units with snacks overnight (no insulin if not eating snacks   + 3 units to base dose of Humalog if higher carb meals     - continue to follow a low carb diet   - continue to stay active as currently doing.   -Discussed importance of hydration. Patient advised to drink 1-2 glasses additional of water than usually to avoid dehydration while on Jardiance.   -reviewed target goal BG readings and A1C  -reviewed when to call and notify me of abnormal BG readings.     b) Nephropathy: GFR: 129 and urine  MA: 131.1 on 2023 --> repeat labs   c) UTD with optho - followed by Dr. Miranda   d) Foot exam:  normal today 2024  e)cont. using Freestyle aislinn 3 CGM  f) Life style changes reviewed     2. Blood Pressure Management   -on lisinopril 5mg once daily   - normotensive today     3.mixed hyperlipidemia   -LDL: 55 and Tri on 2023  - on rosuvastatin 20mg at bedtime and ezetimibe 10mg once daily, fenofibrate 160mg once daily  and fish oil 1,000mg   - repeat lipid panel       RTC in 3 months   Patient instructed to call sooner if they develop Blood glucose readings <75 and/or if they have readings persistently >200.     The risks and benefits of my recommendations were discussed with the patient today. questions were also answered to the best of my knowledge. Patient verbalizes understanding of these issues and agrees to the plan.    2024  DARYL Shah

## 2024-09-23 NOTE — TELEPHONE ENCOUNTER
Called and spoke with the office of Dr. Miranda, the  will be faxing over the most recent progress notes, awaiting fax. Received fax, gave to provider for review and signature.

## 2024-09-28 ENCOUNTER — OFFICE VISIT (OUTPATIENT)
Dept: FAMILY MEDICINE CLINIC | Facility: CLINIC | Age: 29
End: 2024-09-28

## 2024-09-28 VITALS
WEIGHT: 266.5 LBS | DIASTOLIC BLOOD PRESSURE: 75 MMHG | HEART RATE: 87 BPM | SYSTOLIC BLOOD PRESSURE: 111 MMHG | BODY MASS INDEX: 42 KG/M2

## 2024-09-28 DIAGNOSIS — Z23 ENCOUNTER FOR ADMINISTRATION OF VACCINE: ICD-10-CM

## 2024-09-28 DIAGNOSIS — R79.89 ELEVATED LFTS: Primary | ICD-10-CM

## 2024-09-28 PROCEDURE — 99213 OFFICE O/P EST LOW 20 MIN: CPT | Performed by: NURSE PRACTITIONER

## 2024-09-28 PROCEDURE — 90656 IIV3 VACC NO PRSV 0.5 ML IM: CPT | Performed by: NURSE PRACTITIONER

## 2024-09-28 PROCEDURE — 90471 IMMUNIZATION ADMIN: CPT | Performed by: NURSE PRACTITIONER

## 2024-09-28 NOTE — PROGRESS NOTES
HPI    Patient presents to discuss recent abnormal labs that were completed with endocrinology.  Had an ALT of 57 as well as proteinuria which has improved from previous labs.  Has been working on healthy diet and regular exercise.  Recent A1c down to 6.9.  Would like flu vaccine in office today.    Review of Systems   All other systems reviewed and are negative.       Vitals:    24 1111   BP: 111/75   Pulse: 87   Weight: 266 lb 8 oz (120.9 kg)     Body mass index is 41.74 kg/m².    Health Maintenance   Topic Date Due    Tobacco Cessation Counseling  Never done    COVID-19 Vaccine (3 -  season) 2024    Annual Physical  10/31/2024    Influenza Vaccine (1) 10/01/2024    Diabetes Care A1C  2025    Diabetes Care Dilated Eye Exam  2025    Diabetes Care Foot Exam  2025    Diabetes Care: GFR  2025    Diabetes Care: Microalb/Creat Ratio  2025    DTaP,Tdap,and Td Vaccines (2 - Td or Tdap) 10/28/2031    Annual Depression Screening  Completed    Pneumococcal Vaccine: Birth to 64yrs  Completed       Past Medical History:    COVID-19    Diabetes (HCC)    Eruptive xanthoma    Essential hypertension    High blood pressure    High cholesterol    Hyperlipidemia    Hypertriglyceridemia    Pancreatitis (HCC)    Trigeminal neuralgia       .  Past Surgical History:   Procedure Laterality Date    Other      Testicle Surgery    Other surgical history         Family History   Problem Relation Age of Onset    Diabetes Mother     Hypertension Mother     Other (Other) Mother         osteoporosis       Social History     Socioeconomic History    Marital status:      Spouse name: Not on file    Number of children: Not on file    Years of education: Not on file    Highest education level: Not on file   Occupational History    Not on file   Tobacco Use    Smoking status: Former     Current packs/day: 0.00     Types: Cigarettes     Quit date: 2020     Years since quittin.7     Smokeless tobacco: Current   Vaping Use    Vaping status: Every Day    Devices: Disposable   Substance and Sexual Activity    Alcohol use: Never    Drug use: Yes     Types: Cannabis    Sexual activity: Not on file   Other Topics Concern    Not on file   Social History Narrative    Not on file     Social Determinants of Health     Financial Resource Strain: Not on file   Food Insecurity: Not on file   Transportation Needs: Not on file   Physical Activity: Not on file   Stress: Not on file   Social Connections: Not on file   Housing Stability: Not on file       Current Outpatient Medications   Medication Sig Dispense Refill    Continuous Glucose Sensor (FREESTYLE JONY 3 PLUS SENSOR) Does not apply Misc 2 each every 30 (thirty) days. Change sensor every 15 days. 6 each 1    lisinopril 5 MG Oral Tab Take 1 tablet (5 mg total) by mouth daily. 90 tablet 3    Empagliflozin (JARDIANCE) 25 MG Oral Tab Take 25 mg by mouth daily. 90 tablet 0    Continuous Glucose Sensor (FREESTYLE JONY 3 SENSOR) Does not apply Misc 1 each every 14 (fourteen) days. 6 each 1    Tirzepatide (MOUNJARO) 10 MG/0.5ML Subcutaneous Solution Pen-injector Inject 10 mg into the skin once a week. 2 mL 0    LANTUS SOLOSTAR 100 UNIT/ML Subcutaneous Solution Pen-injector Inject 60 Units into the skin daily. 54 mL 0    Insulin Lispro, 1 Unit Dial, 100 UNIT/ML Subcutaneous Solution Pen-injector Inject 25 units with breakfast, 25 units with lunch and 22 units with dinner. Max daily dose 72 units. 65 mL 0    nystatin 100,000 Units/g External Cream APPLY TOPICALLY TWICE DAILY TO THE RASH FOR 10-14 DAYS 30 g 1    rosuvastatin 20 MG Oral Tab Take 1 tablet (20 mg total) by mouth nightly. For cholesterol. 90 tablet 3    ezetimibe 10 MG Oral Tab Take 1 tablet (10 mg total) by mouth daily. 90 tablet 3    metFORMIN HCl 1000 MG Oral Tab Take 1 tablet (1,000 mg total) by mouth 2 (two) times daily with meals. 180 tablet 3    Fenofibrate 160 MG Oral Tab Take 1 tablet (160  mg total) by mouth daily. 90 tablet 3    ONETOUCH ULTRA In Vitro Strip Check sugars twice daily and as needed. 100 each 11    triamcinolone 0.1 % External Cream Apply 1 Application topically 2 (two) times daily as needed. 60 g 0    insulin glargine 100 UNIT/ML Subcutaneous Solution Inject 50 Units into the skin every morning. 50 mL 1    BD PEN NEEDLE MINI U/F 31G X 5 MM Does not apply Misc       omega-3 fatty acids 1000 MG Oral Cap Take 2,000 mg by mouth 2 (two) times daily with meals.      Tirzepatide (MOUNJARO) 12.5 MG/0.5ML Subcutaneous Solution Pen-injector Inject 12.5 mg into the skin once a week. (Patient not taking: Reported on 9/28/2024) 6 mL 0       Allergies:  Allergies   Allergen Reactions    Niacin ITCHING       Physical Exam  Vitals and nursing note reviewed.   Constitutional:       General: He is not in acute distress.     Appearance: Normal appearance.   Cardiovascular:      Rate and Rhythm: Normal rate and regular rhythm.      Heart sounds: Normal heart sounds. No murmur heard.  Pulmonary:      Effort: Pulmonary effort is normal. No respiratory distress.      Breath sounds: Normal breath sounds. No stridor. No wheezing, rhonchi or rales.   Chest:      Chest wall: No tenderness.   Neurological:      Mental Status: He is alert and oriented to person, place, and time.          Assessment and Plan:   Problem List Items Addressed This Visit    None  Visit Diagnoses       Elevated LFTs    -  Primary    Relevant Orders    Hepatic Function Panel (7) [E]    Encounter for administration of vaccine        Relevant Orders    INFLUENZA VACCINE, TRI, PRESERV FREE, 0.5 ML           Continue with healthy diet and regular exercise efforts.  Follow-up in 6 months for repeat LFTs.  Influenza vaccine in office today.    Discussed plan of care with patient and patient is in agreement.  All questions answered. Patient to call with questions or concerns.    Encouraged to sign up for My Chart if not already registered.

## 2024-10-04 DIAGNOSIS — K21.9 GASTROESOPHAGEAL REFLUX DISEASE, UNSPECIFIED WHETHER ESOPHAGITIS PRESENT: ICD-10-CM

## 2024-10-04 DIAGNOSIS — E11.65 UNCONTROLLED DIABETES MELLITUS WITH HYPERGLYCEMIA, WITH LONG-TERM CURRENT USE OF INSULIN (HCC): ICD-10-CM

## 2024-10-04 DIAGNOSIS — E78.2 MIXED HYPERLIPIDEMIA: ICD-10-CM

## 2024-10-04 DIAGNOSIS — Z79.4 UNCONTROLLED DIABETES MELLITUS WITH HYPERGLYCEMIA, WITH LONG-TERM CURRENT USE OF INSULIN (HCC): ICD-10-CM

## 2024-10-07 RX ORDER — ROSUVASTATIN CALCIUM 20 MG/1
20 TABLET, COATED ORAL NIGHTLY
Qty: 90 TABLET | Refills: 3 | Status: SHIPPED | OUTPATIENT
Start: 2024-10-07

## 2024-10-07 RX ORDER — EZETIMIBE 10 MG/1
10 TABLET ORAL DAILY
Qty: 90 TABLET | Refills: 3 | Status: SHIPPED | OUTPATIENT
Start: 2024-10-07

## 2024-10-07 RX ORDER — PANTOPRAZOLE SODIUM 40 MG/1
40 TABLET, DELAYED RELEASE ORAL
Qty: 90 TABLET | Refills: 3 | Status: SHIPPED | OUTPATIENT
Start: 2024-10-07

## 2024-10-07 NOTE — TELEPHONE ENCOUNTER
REFILL PASSED PER [facility] PROTOCOLS    Requested Prescriptions   Pending Prescriptions Disp Refills    EZETIMIBE 10 MG Oral Tab [Pharmacy Med Name: Ezetimibe 10 MG Oral Tablet] 30 tablet 0     Sig: Take 1 tablet by mouth once daily       Cholesterol Medication Protocol Passed - 10/4/2024 11:41 AM        Passed - ALT < 80     Lab Results   Component Value Date    ALT 57 (H) 09/23/2024             Passed - ALT resulted within past year        Passed - Lipid panel within past 12 months     Lab Results   Component Value Date    CHOLEST 150 09/23/2024    TRIG 194 (H) 09/23/2024    HDL 29 (L) 09/23/2024    LDL 88 09/23/2024    VLDL 31 (H) 09/23/2024    NONHDLC 121 09/23/2024             Passed - In person appointment or virtual visit in the past 12 mos or appointment in next 3 mos     Recent Outpatient Visits              1 week ago Elevated LFTs    [facility] Jannet Lam APRN    Office Visit    2 weeks ago Type 2 diabetes mellitus without complication, with long-term current use of insulin ([facility])    [facility] Martha Gomez APRN    Office Visit    2 months ago Uncontrolled type 2 diabetes mellitus with hyperglycemia, with long-term current use of insulin ([facility])    [facility], Martha Donovan APRN    Office Visit    6 months ago Uncontrolled diabetes mellitus with hyperglycemia, with long-term current use of insulin ([facility])    [facility]    Nurse Only    10 months ago Uncontrolled type 2 diabetes mellitus with hyperglycemia, with long-term current use of insulin ([facility])    [facility], Martha Donovan APRN    Telemedicine          Future Appointments         Provider Department Appt Notes    In 2 months Martha Gomez APRN [facility] 3mo follow up                       PANTOPRAZOLE 40 MG Oral Tab EC [Pharmacy Med Name: Pantoprazole Sodium 40 MG Oral Tablet Delayed Release] 30 tablet 0     Sig: TAKE 1 TABLET BY MOUTH ONCE DAILY IN THE MORNING BEFORE BREAKFAST TO HELP WITH STOMACH ACID AND STOMACH IRRITATION/INFLAMATION.       Gastrointestional Medication Protocol Passed - 10/4/2024 11:41 AM        Passed - In person appointment or virtual visit in the past 12 mos or appointment in next 3 mos     Recent Outpatient Visits              1 week ago Elevated LFTs    [facility] Jannet Lam APRN    Office Visit    2 weeks ago Type 2 diabetes mellitus without complication, with long-term current use of insulin ([facility])    [facility] Martha Gomez APRN    Office Visit    2 months ago Uncontrolled type 2 diabetes mellitus with hyperglycemia, with long-term current use of insulin ([facility])    [facility]    Office Visit    6 months ago Uncontrolled diabetes mellitus with hyperglycemia, with long-term current use of insulin ([facility])    [facility]    Nurse Only    10 months ago Uncontrolled type 2 diabetes mellitus with hyperglycemia, with long-term current use of insulin ([facility])    [facility]    Telemedicine          Future Appointments         Provider Department Appt Notes    In 2 months Martha Gomez APRN [facility] 3mo follow up                      ROSUVASTATIN 20 MG Oral Tab [Pharmacy Med Name: Rosuvastatin Calcium 20 MG Oral Tablet] 30 tablet 0     Sig: TAKE 1 TABLET BY MOUTH NIGHTLY FOR CHOLESTEROL       Cholesterol Medication Protocol Passed - 10/4/2024 11:41 AM        Passed - ALT < 80     Lab Results   Component Value Date    ALT 57 (H) 09/23/2024             Passed - ALT resulted within past  year        Passed - Lipid panel within past 12 months     Lab Results   Component Value Date    CHOLEST 150 09/23/2024    TRIG 194 (H) 09/23/2024    HDL 29 (L) 09/23/2024    LDL 88 09/23/2024    VLDL 31 (H) 09/23/2024    NONHDLC 121 09/23/2024             Passed - In person appointment or virtual visit in the past 12 mos or appointment in next 3 mos     Recent Outpatient Visits              1 week ago Elevated LFTs    [facility] Jannet Lam APRN    Office Visit    2 weeks ago Type 2 diabetes mellitus without complication, with long-term current use of insulin ([facility])    [facility] Matrha Gomez APRN    Office Visit    2 months ago Uncontrolled type 2 diabetes mellitus with hyperglycemia, with long-term current use of insulin ([facility])    [facility]    Office Visit    6 months ago Uncontrolled diabetes mellitus with hyperglycemia, with long-term current use of insulin ([facility])    [facility]    Nurse Only    10 months ago Uncontrolled type 2 diabetes mellitus with hyperglycemia, with long-term current use of insulin ([facility])    [facility] Martha Gomez APRN    Telemedicine          Future Appointments         Provider Department Appt Notes    In 2 months Martha Gomez APRN [facility] 3mo follow up                      METFORMIN HCL 1000 MG Oral Tab [Pharmacy Med Name: metFORMIN HCl 1000 MG Oral Tablet] 60 tablet 0     Sig: TAKE 1 TABLET BY MOUTH TWICE DAILY WITH MEALS       Diabetes Medication Protocol Passed - 10/4/2024 11:41 AM        Passed - Last A1C < 7.5 and within past 6 months     Lab Results   Component Value Date    A1C 6.9 (A) 09/23/2024             Passed - In person appointment or virtual visit in the past 6 mos or appointment in next 3  mos     Recent Outpatient Visits              1 week ago Elevated LFTs    [facility]    Office Visit    2 weeks ago Type 2 diabetes mellitus without complication, with long-term current use of insulin ([facility])    [facility] Martha Gomez APRN    Office Visit    2 months ago Uncontrolled type 2 diabetes mellitus with hyperglycemia, with long-term current use of insulin ([facility])    [facility], [facility] [place] Martha Gomez APRN    Office Visit    6 months ago Uncontrolled diabetes mellitus with hyperglycemia, with long-term current use of insulin ([facility])    [facility]    Nurse Only    10 months ago Uncontrolled type 2 diabetes mellitus with hyperglycemia, with long-term current use of insulin ([facility])    [facility] [place]    Telemedicine          Future Appointments         Provider Department Appt Notes    In 2 months Martha Gomez APRN [facility] 3mo follow up                    Passed - Microalbumin procedure in past 12 months or taking ACE/ARB        Passed - EGFRCR or GFRNAA > 50     GFR Evaluation  EGFRCR: 126 , resulted on 9/23/2024          Passed - GFR in the past 12 months             Future Appointments         Provider Department Appt Notes    In 2 months Martha Gomez APRN [facility] 3mo follow up          Recent Outpatient Visits              1 week ago [facility]    Office Visit    2 weeks ago Type 2 diabetes mellitus without complication, with long-term current use of insulin ([facility])    [facility] Martha Gomez APRN    Office Visit    2 months ago Uncontrolled type 2 diabetes  mellitus with hyperglycemia, with long-term current use of insulin ([facility])    [facility], [facility], Martha Donovan APRN    Office Visit    6 months ago Uncontrolled diabetes mellitus with hyperglycemia, with long-term current use of insulin ([facility])    [facility], [facility]    Nurse Only    10 months ago Uncontrolled type 2 diabetes mellitus with hyperglycemia, with long-term current use of insulin ([facility])    [facility], [facility], Martha Donovan APRN    Telemedicine

## 2024-10-18 DIAGNOSIS — E78.2 MIXED HYPERLIPIDEMIA: ICD-10-CM

## 2024-10-21 RX ORDER — FENOFIBRATE 160 MG/1
160 TABLET ORAL DAILY
Qty: 90 TABLET | Refills: 3 | Status: SHIPPED | OUTPATIENT
Start: 2024-10-21

## 2024-10-21 NOTE — TELEPHONE ENCOUNTER
REFILL PASSED PER [facility] PROTOCOLS    Requested Prescriptions   Pending Prescriptions Disp Refills    FENOFIBRATE 160 MG Oral Tab [Pharmacy Med Name: Fenofibrate 160 MG Oral Tablet] 30 tablet 0     Sig: Take 1 tablet by mouth once daily       Cholesterol Medication Protocol Passed - 10/21/2024  2:06 PM        Passed - ALT < 80     Lab Results   Component Value Date    ALT 57 (H) 09/23/2024             Passed - ALT resulted within past year        Passed - Lipid panel within past 12 months     Lab Results   Component Value Date    CHOLEST 150 09/23/2024    TRIG 194 (H) 09/23/2024    HDL 29 (L) 09/23/2024    LDL 88 09/23/2024    VLDL 31 (H) 09/23/2024    NONHDLC 121 09/23/2024             Passed - In person appointment or virtual visit in the past 12 mos or appointment in next 3 mos     Recent Outpatient Visits              3 weeks ago Elevated LFTs    [facility] Jannet Lam APRN    Office Visit    4 weeks ago Type 2 diabetes mellitus without complication, with long-term current use of insulin ([facility])    [facility] Martha Gomez APRN    Office Visit    3 months ago Uncontrolled type 2 diabetes mellitus with hyperglycemia, with long-term current use of insulin ([facility])    [facility], Martha Donovan APRN    Office Visit    6 months ago Uncontrolled diabetes mellitus with hyperglycemia, with long-term current use of insulin ([facility])    [facility]    Nurse Only    10 months ago Uncontrolled type 2 diabetes mellitus with hyperglycemia, with long-term current use of insulin ([facility])    [facility], Martha Donovan APRN    Telemedicine          Future Appointments         Provider Department Appt Notes    In 2 months Martha Gomez APRN [facility] 3mo follow up                          Future Appointments         Provider Department Appt Notes    In 2 months Martha Gomez APRN [facility], [facility] 3mo follow up          Recent Outpatient Visits              3 weeks ago Elevated LFTs    [facility], [facility] Jannet Lam, DARYL    Office Visit    4 weeks ago Type 2 diabetes mellitus without complication, with long-term current use of insulin ([facility])    [facility] Martha Gomez APRN    Office Visit    3 months ago Uncontrolled type 2 diabetes mellitus with hyperglycemia, with long-term current use of insulin ([facility])    [facility], Martha Donovan APRN    Office Visit    6 months ago Uncontrolled diabetes mellitus with hyperglycemia, with long-term current use of insulin ([facility])    [facility]    Nurse Only    10 months ago Uncontrolled type 2 diabetes mellitus with hyperglycemia, with long-term current use of insulin ([facility])    [facility], [facility], Martha Donovan APRN    Telemedicine

## 2024-11-04 DIAGNOSIS — E11.65 UNCONTROLLED TYPE 2 DIABETES MELLITUS WITH HYPERGLYCEMIA, WITH LONG-TERM CURRENT USE OF INSULIN (HCC): ICD-10-CM

## 2024-11-04 DIAGNOSIS — Z79.4 UNCONTROLLED TYPE 2 DIABETES MELLITUS WITH HYPERGLYCEMIA, WITH LONG-TERM CURRENT USE OF INSULIN (HCC): ICD-10-CM

## 2024-11-04 RX ORDER — INSULIN GLARGINE 100 [IU]/ML
60 INJECTION, SOLUTION SUBCUTANEOUS DAILY
Qty: 54 ML | Refills: 0 | Status: SHIPPED | OUTPATIENT
Start: 2024-11-04

## 2024-11-04 NOTE — TELEPHONE ENCOUNTER
Endocrine refill protocol for basal insulins     Protocol Criteria: PASSED Reason: N/A    If all below requirements are met, send a 90-day supply with 1 refill per provider protocol.       Verify Appointment with Endocrinology completed in the last 6 months or scheduled in the next 3 months.  Verify A1C has been completed within the last 6 months and is below 8.5%     Last completed office visit:9/23/2024 Martha Gomez APRN   Next scheduled Follow up:   Future Appointments   Date Time Provider Department Center   12/30/2024  8:30 AM Martha Gomez APRN [facility]      Last A1c result: Last A1c value was 6.9% done 9/23/2024.

## 2024-11-15 RX ORDER — EMPAGLIFLOZIN 25 MG/1
25 TABLET, FILM COATED ORAL DAILY
Qty: 90 TABLET | Refills: 1 | Status: SHIPPED | OUTPATIENT
Start: 2024-11-15

## 2024-11-15 NOTE — TELEPHONE ENCOUNTER
Endocrine Refill protocol for oral and injectable diabetic medications    Protocol Criteria:  PASSED      If all below requirements are met, send a 90-day supply with 1 refill per provider protocol.    Verify appointment with Endocrinology completed in the last 6 months or scheduled in the next 3 months.  Verify A1C has been completed within the last 6 months and is below 8.5%     Last completed office visit: 9/23/2024 Martha Gomez APRN   Next scheduled Follow up:   Future Appointments   Date Time Provider Department Center   12/30/2024  8:30 AM Martha Gomez APRN [facility]      Last A1c result: Last A1c value was 6.9% done 9/23/2024.

## 2024-12-04 RX ORDER — INSULIN LISPRO 100 [IU]/ML
INJECTION, SOLUTION INTRAVENOUS; SUBCUTANEOUS
Qty: 63 ML | Refills: 0 | Status: SHIPPED | OUTPATIENT
Start: 2024-12-04

## 2024-12-04 NOTE — TELEPHONE ENCOUNTER
Endocrine refill protocol for rapid acting, regular, intermediate, and mixed insulin:    Protocol Criteria:  PASSED     If all below requirements are met, send a 90-day supply with 1 refill per provider protocol.    Verify appointment with Endocrinology completed in the last 6 months or scheduled in the next 3 months.  Verify A1C has been completed within the last 6 months and is below 8.5%    -May substitute prescriptions for Novolog and Humalog unless documented allergy (pens and vials) at the same dose and concentration per insurance preference and provider protocol.   -May substitute prescriptions for Novolin R and Humulin R unless documented allergy (pens and vials) at the same dose and concentration per insurance preference and provider protocol.   -May substitute prescriptions for Novolin N and Humulin N unless documented allergy (pens and vials) at the same dose and concentration per insurance preference and provider protocol.   -May substitute prescriptions for Humulin and Novolin 70/30 insulin unless documented allergy at the same dose and concentration per insurance preference and provider protocol.    Last completed office visit: 9/23/2024 Martha Gomez APRN   Next scheduled Follow up:   Future Appointments   Date Time Provider Department Center   12/30/2024  8:30 AM Martha Gomez APRN [facility]      Last A1C result: 6.9% done 9/23/2024.

## 2024-12-30 ENCOUNTER — OFFICE VISIT (OUTPATIENT)
Dept: ENDOCRINOLOGY CLINIC | Facility: CLINIC | Age: 29
End: 2024-12-30

## 2024-12-30 VITALS
DIASTOLIC BLOOD PRESSURE: 83 MMHG | BODY MASS INDEX: 42 KG/M2 | HEART RATE: 112 BPM | WEIGHT: 268 LBS | SYSTOLIC BLOOD PRESSURE: 119 MMHG

## 2024-12-30 DIAGNOSIS — R80.9 TYPE 2 DIABETES MELLITUS WITH DIABETIC MICROALBUMINURIA, WITH LONG-TERM CURRENT USE OF INSULIN (HCC): Primary | ICD-10-CM

## 2024-12-30 DIAGNOSIS — E11.29 TYPE 2 DIABETES MELLITUS WITH DIABETIC MICROALBUMINURIA, WITH LONG-TERM CURRENT USE OF INSULIN (HCC): Primary | ICD-10-CM

## 2024-12-30 DIAGNOSIS — Z79.4 TYPE 2 DIABETES MELLITUS WITH DIABETIC MICROALBUMINURIA, WITH LONG-TERM CURRENT USE OF INSULIN (HCC): Primary | ICD-10-CM

## 2024-12-30 LAB
GLUCOSE BLOOD: 153
HEMOGLOBIN A1C: 7 % (ref 4.3–5.6)
TEST STRIP LOT #: NORMAL NUMERIC

## 2024-12-30 PROCEDURE — 82947 ASSAY GLUCOSE BLOOD QUANT: CPT | Performed by: NURSE PRACTITIONER

## 2024-12-30 PROCEDURE — 83036 HEMOGLOBIN GLYCOSYLATED A1C: CPT | Performed by: NURSE PRACTITIONER

## 2024-12-30 PROCEDURE — 99213 OFFICE O/P EST LOW 20 MIN: CPT | Performed by: NURSE PRACTITIONER

## 2024-12-30 NOTE — PATIENT INSTRUCTIONS
A1C: 7.0% on 12/30/2024 --> previously was 6.9% on 9/23/2024  Blood glucose: 153 in clinic today  Endocrinology fax# [phone]    Medications:   - continue with Metformin 1,000mg twice daily   - continue with Jardiance 25mg once daily in AM   - continue with Mounjaro 12.5mg weekly  - continue with Lantus 60 units daily in AM  - continue with Humalog          25 units with 12pm meal          25 units with 5pm meal          4 units with snacks overnight (no insulin if not eating snacks)    + 3 units to base dose of Humalog if eating higher carb meals     Weight:  Wt Readings from Last 6 Encounters:   12/30/24 268 lb (121.6 kg)   09/28/24 266 lb 8 oz (120.9 kg)   09/23/24 266 lb 9.6 oz (120.9 kg)   07/16/24 272 lb (123.4 kg)   10/31/23 275 lb 12.8 oz (125.1 kg)   05/08/23 269 lb (122 kg)     A1C goal:  <7.0%    Blood sugar testing:  Continue using Freestyle aislinn 3 continuous glucometer     Blood sugar targets:  Before breakfast:  (preferably < 110)  2 hours after meals: <150    Call for persistent blood sugars < 75 or > 200

## 2024-12-30 NOTE — PROGRESS NOTES
Name: He Aguilera  Date: 12/30/2024    CHIEF COMPLAINT   Chief Complaint   Patient presents with    Diabetes     HISTORY OF PRESENT ILLNESS   He Aguilera is a 29 year old male who presents for follow up on diabetes management.  HbA1C: 7.0% on 12/30/2024. Previously was 6.9% on 9/23/2024.   Blood glucose: 153 in clinic today.   Patient notes that he has been feeling well and denies any health changes or taking new medications since last office visit. He continues to follow a low carb diet and staying active per usual. He has been compliant with taking all diabetes medications.     FAMILY HISTORY OF DIABETES  -father, mother and maternal grandfather   DIABETES HISTORY  Diagnosed: 4 years ago - transitioned to [place] insulin 3 years ago   Prior HbA, C or glycohemoglobin were 12.5% 10/26/2021; 11.2% 11/6/2023; 10.4% 7/16/2024; 6.9% 9/23/2024; 7.0% at POC today;     + Hospitalization with hyperglycemia 4 years ago when diagnosed   + pancreatitis in 5/2021    PREVIOUS MEDICATION FOR DM:  -Victoza-d/c'ed due to high cost  - Farxiga-does not remember taking  - Ozempic -does not remember taking       CURRENT MEDICATIONS FOR DM:  Metformin 1,000mg twice daily   Jardiance 25mg once daily in AM - tolerating well; denies  s/e  Mounjaro 12.5mg once weekly on sundays- tolerating well; denies GI s/e  Lantus 70 units once daily in AM -- has been taking 60 units   Humalog   25 units at 12pm meal   25 units with 5pm meal    4 units with snack overnight     HOME GLUCOSE READINGS:   Works night shift: 12am to 12pm   Meal times are: 12pm, 5pm and 7-8am  Occasionally overnight eating snacks while at work     BG readings   Freestyle aislinn 3 CGM download reviewed     Report Period: 12/03/2024 - 12/30/2024 (28 days)  Generated: 12/30/2024  % Time CGM Active: 95%     Glucose Statistics and Targets  Average Glucose: 191 mg/dL  Glucose Management Indicator (GMI): 7.9%  Glucose Variability (%CV): 28.4%  Target Range: 70 - 180 mg/dL     Time in  Ranges  Very High: >250 mg/dL --- 14%  High: 181 - 250 mg/dL --- 34%  Target Range: 70 - 180 mg/dL --- 52%  Low: 54 - 69 mg/dL --- 0%  Very Low: <54 mg/dL --- 0%     Continuous Glucose Monitoring Interpretation    He Aguilera has undergone continuous glucose monitoring with the personal Freestyle aislinn 3 continuous glucose monitor.  The blood glucose tracings were evaluated for two weeks prior to office visit. His blood glucose tracings demonstrated overall stable patterns with some occasional postprandial hyperglycemia if eating higher carb meals than usual. He did not experience any hypoglycemia during the week of evaluation.  As a result of his testing his medications were adjusted per below.     HISTORY OF DIABETES COMPLICATIONS:  History of Retinopathy: denies - last eye exam within the last 12 months: yes - followed by Dr. Miranda   History of Neuropathy: no   History of Nephropathy: no     ASSOCIATED COMPLICATIONS:   HTN: yes   Hyperlipidemia: yes   Cardiovascular Disease: no   Peripheral Vascular Disease: no     DIETARY COMPLIANCE:  Good; following a low carb diet   - has been eating higher carb meals than usual lately due to holidays     EXERCISE:   No- however walking frequently while at work     Polyuria, polyphagia, polydipsia: no   Paresthesias: no   Blurred vision: no   Recent steroids, illness or infections: no     REVIEW OF SYSTEMS  Constitutional: Negative for: weight change, fever, fatigue, cold/heat intolerance  Eyes: Negative for:  Visual changes, proptosis, blurring  ENT: Negative for:  dysphagia, neck swelling, dysphonia  Respiratory: Negative for: hemoptysis, shortness of breath, cough, or dyspnea.  Cardiovascular: Negative for:  chest pain, chest discomfort, palpitations  GI: Negative for:  abdominal pain, nausea, vomiting, diarrhea, heartburn, constipation  Neurology: Negative for: headache, dizziness, syncope, numbness/tingling, or weakness.   Genito-Urinary: Negative for: dysuria,  frequency or hematuria   Hematology/Lymphatics: Negative for: bruising, easy bleeding, lower extremity edema  Skin: Negative for: rash, blister, infection or ulcers.  Endocrine: Negative for: polyuria, polydipsia. No osteoporosis. No thyroid disease.     MEDICATIONS:     Current Outpatient Medications:     Insulin Lispro, 1 Unit Dial, 100 UNIT/ML Subcutaneous Solution Pen-injector, Inject 30 Units into the skin daily with lunch AND 30 Units daily with dinner. May also inject 10 Units with snacks., Disp: 63 mL, Rfl: 0    JARDIANCE 25 MG Oral Tab, Take 1 tablet by mouth once daily, Disp: 90 tablet, Rfl: 1    LANTUS SOLOSTAR 100 UNIT/ML Subcutaneous Solution Pen-injector, INJECT 60 UNITS SUBCUTANEOUSLY ONCE DAILY, Disp: 54 mL, Rfl: 0    Fenofibrate 160 MG Oral Tab, Take 1 tablet (160 mg total) by mouth daily., Disp: 90 tablet, Rfl: 3    ezetimibe 10 MG Oral Tab, Take 1 tablet by mouth once daily, Disp: 90 tablet, Rfl: 3    pantoprazole 40 MG Oral Tab EC, TAKE 1 TABLET BY MOUTH ONCE DAILY IN THE MORNING BEFORE BREAKFAST TO HELP WITH STOMACH ACID AND STOMACH IRRITATION/INFLAMATION., Disp: 90 tablet, Rfl: 3    rosuvastatin 20 MG Oral Tab, TAKE 1 TABLET BY MOUTH NIGHTLY FOR CHOLESTEROL, Disp: 90 tablet, Rfl: 3    metFORMIN HCl 1000 MG Oral Tab, TAKE 1 TABLET BY MOUTH TWICE DAILY WITH MEALS, Disp: 180 tablet, Rfl: 3    Tirzepatide (MOUNJARO) 12.5 MG/0.5ML Subcutaneous Solution Pen-injector, Inject 12.5 mg into the skin once a week., Disp: 6 mL, Rfl: 0    Continuous Glucose Sensor (FREESTYLE JONY 3 PLUS SENSOR) Does not apply Misc, 2 each every 30 (thirty) days. Change sensor every 15 days., Disp: 6 each, Rfl: 1    lisinopril 5 MG Oral Tab, Take 1 tablet (5 mg total) by mouth daily., Disp: 90 tablet, Rfl: 3    Continuous Glucose Sensor (FREESTYLE JONY 3 SENSOR) Does not apply Misc, 1 each every 14 (fourteen) days., Disp: 6 each, Rfl: 1    nystatin 100,000 Units/g External Cream, APPLY TOPICALLY TWICE DAILY TO THE RASH FOR  10-14 DAYS, Disp: 30 g, Rfl: 1    ONETOUCH ULTRA In Vitro Strip, Check sugars twice daily and as needed., Disp: 100 each, Rfl: 11    triamcinolone 0.1 % External Cream, Apply 1 Application topically 2 (two) times daily as needed., Disp: 60 g, Rfl: 0    BD PEN NEEDLE MINI U/F 31G X 5 MM Does not apply Misc, , Disp: , Rfl:     omega-3 fatty acids 1000 MG Oral Cap, Take 2,000 mg by mouth 2 (two) times daily with meals., Disp: , Rfl:     Tirzepatide (MOUNJARO) 10 MG/0.5ML Subcutaneous Solution Pen-injector, Inject 10 mg into the skin once a week. (Patient not taking: Reported on 2024), Disp: 2 mL, Rfl: 0    insulin glargine 100 UNIT/ML Subcutaneous Solution, Inject 50 Units into the skin every morning. (Patient not taking: Reported on 2024), Disp: 50 mL, Rfl: 1    ALLERGIES:   Allergies   Allergen Reactions    Niacin ITCHING       SOCIAL HISTORY:   Social History     Socioeconomic History    Marital status:    Tobacco Use    Smoking status: Former     Current packs/day: 0.00     Types: Cigarettes     Quit date: 2020     Years since quittin.0    Smokeless tobacco: Current   Vaping Use    Vaping status: Every Day    Devices: Disposable   Substance and Sexual Activity    Alcohol use: Never    Drug use: Yes     Types: Cannabis     PAST MEDICAL HISTORY:   Past Medical History:    COVID-19    Diabetes (HCC)    Eruptive xanthoma    Essential hypertension    High blood pressure    High cholesterol    Hyperlipidemia    Hypertriglyceridemia    Pancreatitis (HCC)    Trigeminal neuralgia     PAST SURGICAL HISTORY:   Past Surgical History:   Procedure Laterality Date    Other      Testicle Surgery    Other surgical history       PHYSICAL EXAM:   Vitals:    24 0821   BP: 119/83   Pulse: 112   Weight: 268 lb (121.6 kg)     BMI:   Body mass index is 41.97 kg/m².    PHYSICAL EXAM  General Appearance:  alert, well developed, in no acute distress  Eyes:  normal conjunctivae, sclera., normal sclera and  normal pupils  Throat/Neck: normal sound to voice.   Respiratory:  non-labored. no increased work of breathing.    Skin:  normal moisture and skin texture  Psychiatric:  oriented to time, self, and place    LABS: Pertinent labs reviewed    ASSESSMENT/PLAN:    -Reviewed with patient the pathogenesis of diabetes, clinical significance of A1c, and common complications such as: microvascular, macrovascular and diabetic ketoacidosis. Patient verbalizes understanding of the importance of glycemic control and the goals of therapy.   -Discussed with patient glucose targets ranges (Fasting  and post prandial <180).     1.Type 2 Diabetes Mellitus, controlled with long term insulin use   -LAB DATA  HbA,C: 7.0% today   a) Medications  - continue with Metformin 1,000mg twice daily   - continue with Jardiance 25mg once daily in AM   - continue with Mounjaro 12.5mg weekly  - continue with Lantus 60 units daily in AM  - continue with Humalog          25 units with 12pm meal          25 units with 5pm meal          4 units with snacks overnight (no insulin if not eating snacks     + 3 units to base dose of Humalog if eating a higher carb meals     - continue to work on eating a low carb diet more consistently    - continue to stay active as currently doing.   -Discussed importance of hydration. Patient advised to drink 1-2 glasses additional of water than usually to avoid dehydration while on Jardiance.   -reviewed target goal BG readings and A1C  -reviewed when to call and notify me of abnormal BG readings.     b) Nephropathy: GFR: 126 and urine MA: 54.5 on 2024 --> urine MA improving   c) UTD with optho - followed by Dr. Miranda   d) Foot exam: normal on 2024  e)cont. using Freestyle aislinn 3 CGM  f) Life style changes reviewed     2. Blood Pressure Management   -on lisinopril 5mg once daily   - normotensive today     3.mixed hyperlipidemia   -LDL: 88 and Tri on 2024  - on rosuvastatin 20mg at bedtime and  ezetimibe 10mg once daily, fenofibrate 160mg once daily  and fish oil 1,000mg       RTC in 5 months   Patient instructed to call sooner if they develop Blood glucose readings <75 and/or if they have readings persistently >200.     The risks and benefits of my recommendations were discussed with the patient today. questions were also answered to the best of my knowledge. Patient verbalizes understanding of these issues and agrees to the plan.    12/30/2024  DARYL Shah

## 2024-12-30 NOTE — PROGRESS NOTES
Name: He Woodard  YOB: 1995  Report Period: 12/03/2024 - 12/30/2024 (28 days)  Generated: 12/30/2024  % Time CGM Active: 95%      Glucose Statistics and Targets  Average Glucose: 191 mg/dL  Glucose Management Indicator (GMI): 7.9%  Glucose Variability (%CV): 28.4%  Target Range: 70 - 180 mg/dL      Time in Ranges  Very High: >250 mg/dL --- 14%  High: 181 - 250 mg/dL --- 34%  Target Range: 70 - 180 mg/dL --- 52%  Low: 54 - 69 mg/dL --- 0%  Very Low: <54 mg/dL --- 0%

## 2025-01-03 DIAGNOSIS — Z79.4 TYPE 2 DIABETES MELLITUS WITHOUT COMPLICATION, WITH LONG-TERM CURRENT USE OF INSULIN (HCC): ICD-10-CM

## 2025-01-03 DIAGNOSIS — E11.9 TYPE 2 DIABETES MELLITUS WITHOUT COMPLICATION, WITH LONG-TERM CURRENT USE OF INSULIN (HCC): ICD-10-CM

## 2025-01-03 RX ORDER — TIRZEPATIDE 12.5 MG/.5ML
12.5 INJECTION, SOLUTION SUBCUTANEOUS WEEKLY
Qty: 6 ML | Refills: 1 | Status: SHIPPED | OUTPATIENT
Start: 2025-01-03

## 2025-01-03 NOTE — TELEPHONE ENCOUNTER
Endocrine Refill protocol for oral and injectable diabetic medications    Protocol Criteria:  PASSED  Reason: N/A    If all below requirements are met, send a 90-day supply with 1 refill per provider protocol.    Verify appointment with Endocrinology completed in the last 6 months or scheduled in the next 3 months.  Verify A1C has been completed within the last 6 months and is below 8.5%     Last completed office visit: 12/30/2024 Martha Gomez APRN   Next scheduled Follow up:   Future Appointments   Date Time Provider Department Center   6/2/2025  8:30 AM Martha Gomez APRN [facility]      Last A1c result: Last A1c value was 7% done 12/30/2024.

## 2025-01-13 ENCOUNTER — TELEPHONE (OUTPATIENT)
Dept: ENDOCRINOLOGY CLINIC | Facility: CLINIC | Age: 30
End: 2025-01-13

## 2025-01-13 NOTE — TELEPHONE ENCOUNTER
Medication Quantity Refills Start End   Continuous Glucose Sensor (FREESTYLE JONY 3 PLUS SENSOR) Does not apply Misc 6 each 1 9/10/2024 --   Si each every 30 (thirty) days. Change sensor every 15 days.     Route:   Does not apply         KEY: [id number]

## 2025-01-13 NOTE — TELEPHONE ENCOUNTER
Medication PA Requested: Continuous Glucose Sensor (FREESTYLE JONY 3 PLUS SENSOR) Does not apply Misc                                                       CoverMyMeds Used: yes  Key:[id number]   Quantity: 6 each  Day Supply: 90  Si each every 30 (thirty) days. Change sensor every 15 days.   DX Code:  E11.29

## 2025-01-20 NOTE — TELEPHONE ENCOUNTER
Medication PA Requested: Continuous Glucose Sensor (FREESTYLE JONY 3 PLUS SENSOR) Does not apply Misc                                                       CoverMyMeds Used: yes  Key:[id number]   Quantity: 6 each  Day Supply: 90  Si each every 30 (thirty) days. Change sensor every 15 days.   DX Code:  E11.29    PA submitted with LOV and A1c 24  Awaiting determination

## 2025-02-12 DIAGNOSIS — E11.65 UNCONTROLLED TYPE 2 DIABETES MELLITUS WITH HYPERGLYCEMIA, WITH LONG-TERM CURRENT USE OF INSULIN (HCC): ICD-10-CM

## 2025-02-12 DIAGNOSIS — Z79.4 UNCONTROLLED TYPE 2 DIABETES MELLITUS WITH HYPERGLYCEMIA, WITH LONG-TERM CURRENT USE OF INSULIN (HCC): ICD-10-CM

## 2025-02-13 RX ORDER — HYDROCHLOROTHIAZIDE 12.5 MG/1
2 CAPSULE ORAL
Qty: 6 EACH | Refills: 1 | Status: SHIPPED | OUTPATIENT
Start: 2025-02-13

## 2025-02-13 NOTE — TELEPHONE ENCOUNTER
Endocrine Refill protocol for CGM supplies     Protocol Criteria:  PASSED Reason: N/A    If below requirement is met, send a 90-day supply with 1 refill per provider protocol.     Verify appointment with Endocrinology completed in the last 12 months or scheduled in the next 6 months     Last completed office visit:12/30/2024 Martha Gomez APRN   Next scheduled Follow up:   Future Appointments   Date Time Provider Department Center   6/2/2025  8:30 AM Martha Gomez APRN [facility]

## 2025-02-20 NOTE — PROGRESS NOTES
Chief Complaint   Patient presents with    Atrial Fibrillation       Visit Vitals  /72   Pulse (!) 52   Ht 4' 11\" (1.499 m)   Wt 72.1 kg (159 lb)   LMP 12/29/2011   BMI 32.11 kg/m²       Carol Reyes is a 61 year old here in follow up for palpitations    Patient reports having palpitations. She purchased an Apple watch due to this and noticed a couple episodes that the watch read as atrial fibrillation while she was resting in bed at night.  Patient reports she had palpitations a long time ago.  Patient has history of high calcium score with negative stress test in 2019.     CT cardiac calcium score 07/2019 revealed total of 367; LMA: 8, RCA: 119, LAD: 174, LCX: 66.   NM Stress test 07/19/2019 demonstrates fixed defect in mid to distal anteroseptal wall consistent with breast attenuation.  No evidence of ischemia.  She had a 27 day event monitor in Jan 2024 - showing 3% AF - longest AF episode 9 min but total duration of AF on monitor is 14 hours - average AF HR is 139 bpm  She is on eliquis (started 1/23/2024) and toprol 50 mg daily. She was started on Multaq at visit 2/29/2024.  She continued to experience AF episodes and ultimately underwent AF ablation 10/15/2024. She continues on Multaq.     She is tolerating her medications well and denies bleeding with Eliquis. She denies any episodes of lightheadedness, dizziness, palpitations, chest pain, shortness of breath or syncope. Patient states she is doing ok from a rhythm standpoint.             Review of Systems   Constitutional:  Negative for malaise/fatigue.   HENT:  Negative for nosebleeds.    Respiratory:  Negative for shortness of breath.    Cardiovascular:  Negative for chest pain, palpitations and leg swelling.   Gastrointestinal:  Negative for blood in stool.   Genitourinary:  Negative for hematuria.   Neurological:  Negative for dizziness and loss of consciousness.       Physical Exam  Vitals and nursing note reviewed.   Constitutional:        This visit is conducted using Telemedicine with live, interactive video and audio. Name: Rhys Webster  Date: 12/1/2023    CHIEF COMPLAINT   F/u on T2DM  HISTORY OF PRESENT ILLNESS   Rhys Webster is a 25year old male who presents for follow up on diabetes management. Has lost to follow up since 2021.    HbA1C: 11.2% on 11/6/2023    FAMILY HISTORY OF DIABETES  -father, mother and maternal grandfather   DIABETES HISTORY  Diagnosed: 4 years ago - transitioned to LA insulin 3 years ago   Prior HbA, C or glycohemoglobin were 12.5% 10/26/2021; 11.2% 11/6/2023;     + Hospitalization with hyperglycemia 4 years ago when diagnosed   + pancreatitis in 5/2021    PREVIOUS MEDICATION FOR DM:  -Victoza-d/c'ed due to high cost  - Farxiga-does not remember taking  -ozempic -does not remember taking       CURRENT MEDICATIONS FOR DM:  Metformin 1,000mg twice daily   Lantus 50 units once daily in AM -- has has taken since 8/2023 due to being out of refills   Humalog 25 units with breakfast - has been taking 50 units with each meal    25 units with lunch   25 units with dinner     Verbalizes compliance with mediations listed above     HOME GLUCOSE READINGS:   Has not been checking BG readings in the past 1.5 months now   - has glucometer at home that works   Hypoglycemia present: no - denies symptoms of hypoglycemia     HISTORY OF DIABETES COMPLICATIONS:  History of Retinopathy: no - last eye exam within the last 12 months: no   History of Neuropathy: no   History of Nephropathy: no     ASSOCIATED COMPLICATIONS:   HTN: yes   Hyperlipidemia: yes   Cardiovascular Disease: no   Peripheral Vascular Disease: no     DIETARY COMPLIANCE:  Fair compliance     EXERCISE:   Yes - twice weekly - 1hr walking     Polyuria, polyphagia, polydipsia: yes - polydipsia   Paresthesias: no   Blurred vision: no   Recent steroids, illness or infections: no     REVIEW OF SYSTEMS  Constitutional: Negative for: weight change, fever, fatigue, cold/heat General: She is not in acute distress.     Appearance: She is well-developed.   HENT:      Head: Normocephalic and atraumatic.   Cardiovascular:      Rate and Rhythm: Normal rate and regular rhythm.   Pulmonary:      Effort: Pulmonary effort is normal.   Musculoskeletal:      Comments: ambulatory without assistance   Neurological:      Mental Status: She is alert and oriented to person, place, and time.   Psychiatric:         Behavior: Behavior normal.         Thought Content: Thought content normal.         Judgment: Judgment normal.          Creatinine (mg/dL)   Date Value   10/11/2024 1.06 (H)     Ejection Fraction   Date Value Ref Range Status   11/10/2023 61 % Final   Final Impressions    * Mild left ventricular hypertrophy.    * Left ventricular ejection fraction; 61 %.    * No left ventricular regional wall motion abnormalities.    * LV Global longitudinal strain -17.5 %.    * Indeterminate left ventricular diastolic dysfunction.    * Normal right ventricular systolic function.    * Right ventricular systolic pressure; 24 mmHg.    * Left atrial volume index of 44.6 ml/m2.    * Mild mitral valve regurgitation.    * No pericardial effusion.  Left atrial volume index of 44.6 ml/m2.        NM myocardial perfusion testing 1/31/2024:  IMPRESSION:    1. Normal myocardial perfusion scans following a regadenoson injection and at rest    2. Normal post-regadenoson LV systolic function. LVEF:  66 %     3. Cardiac Risk Assessment: Low     4.Compared to previous SPECT study from July 19, 2019 , there is no significant change.    Assessment & Plan     High risk medication use - Multaq  Started 2/2024  No sodium or potassium channel toxicity noted. Rhythm strip reviewed.  Tolerating well, continue current dose.    Can discontinue. Keep a few tablets on hand for tachy-palpitations/atrial fibrillation.      Anticoagulation with Eliquis  Creatinine (mg/dL)   Date Value   10/11/2024 1.06 (H)      GOT/AST (Units/L)   Date Value  intolerance  Eyes: Negative for:  Visual changes, proptosis, blurring  ENT: Negative for:  dysphagia, neck swelling, dysphonia  Respiratory: Negative for: hemoptysis, shortness of breath, cough, or dyspnea. Cardiovascular: Negative for:  chest pain, chest discomfort, palpitations  GI: Negative for:  abdominal pain, nausea, vomiting, diarrhea, heartburn, constipation  Neurology: Negative for: headache, dizziness, syncope, numbness/tingling, or weakness. Genito-Urinary: Negative for: dysuria, frequency or hematuria   Hematology/Lymphatics: Negative for: bruising, easy bleeding, lower extremity edema  Skin: Negative for: rash, blister, infection or ulcers. Endocrine: Negative for: polyuria, polydipsia. No osteoporosis. No thyroid disease. MEDICATIONS:     Current Outpatient Medications:     nystatin 100,000 Units/g External Cream, APPLY TOPICALLY TWICE DAILY TO THE RASH FOR 10-14 DAYS, Disp: 30 g, Rfl: 1    HUMALOG 100 UNIT/ML Injection Solution, Inject 32 Units into the skin 3 (three) times daily before meals. , Disp: 87 mL, Rfl: 0    niacin 500 MG Oral Tab, Take 2 tablets (1,000 mg total) by mouth nightly. (Patient not taking: Reported on 10/31/2023), Disp: 180 tablet, Rfl: 3    rosuvastatin 20 MG Oral Tab, Take 1 tablet (20 mg total) by mouth nightly. For cholesterol., Disp: 90 tablet, Rfl: 3    ezetimibe 10 MG Oral Tab, Take 1 tablet (10 mg total) by mouth daily. , Disp: 90 tablet, Rfl: 3    metFORMIN HCl 1000 MG Oral Tab, Take 1 tablet (1,000 mg total) by mouth 2 (two) times daily with meals. , Disp: 180 tablet, Rfl: 3    Fenofibrate 160 MG Oral Tab, Take 1 tablet (160 mg total) by mouth daily. , Disp: 90 tablet, Rfl: 3    pantoprazole 40 MG Oral Tab EC, Take 1 tablet (40 mg total) by mouth every morning before breakfast. To help with stomach acid and stomach irritation/inflammation, Disp: 90 tablet, Rfl: 3    lisinopril 5 MG Oral Tab, Take 1 tablet (5 mg total) by mouth daily. , Disp: 90 tablet, Rfl: 3   08/20/2024 33     GPT/ALT (Units/L)   Date Value   08/20/2024 48     HCT (%)   Date Value   10/11/2024 40.6     HGB (g/dL)   Date Value   10/11/2024 14.4        Tolerating well and denies bleeding.  Continue current dose.  Will plan to discontinue at next office visit if no recurrence of atrial fibrillation.    AF (atrial fibrillation)  (CMD)  Date of initial AF (atrial fibrillation) diagnosis:   2023  Paroxysmal, persistent OR chronic:  PAF  Symptoms, frequency and duration:   palpitations  LA (left atrial) size (date):   44.6 ml/m2 per echo 11/2023  LVEF (left ventricular ejection fraction) (date):   61%   CHADSVASCs score:   2  (gender, HTN)  Dates of ablations:     10/15/2024 -  Pulmonary vein isolation using Pulsed field ablation.  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   Additional PFA lesions were administered to the posterior wall and roof in the flower configuration.  Past chest and abdominal surgeries /dates:  Prior AA (anti-arrythymic) and reason for discontinuing:  None  Currently on anticoagulation?:    Eliquis started 1/2024  SHD -   CT cardiac CA score 2019 = 367  NM myocardial perfusion testing 1/31/2024: No ischemia  LAURA evaluation -     She continued to experience AF episodes and ultimately underwent AF ablation 10/15/2024    Patient is in Normal Sinus Rhythm in office today    Currently managed on multaq and anticoagulated on eliquis    Assessment  Cardiac testing and labs reviewed in office today and are normal. S/P AF ablation 10/15/2024 and ablation report reviewed. Patient is stable from rhythm standpoint and is asymptomatic.Tolerating medications well. Next visit if no reoccurrence of AF, consider discontinuing eliquis. Discontinue Multaq, but keep a few tablets on hand in case of recurrence of tachy-palpitations/atrial fibrillation. Follow up in 6 months    Recommendations  Change current therapy, discontinue Multaq. Will send refill of Multaq for patient to have PRN.  Follow  ONETOUCH ULTRA In Vitro Strip, Check sugars twice daily and as needed. , Disp: 100 each, Rfl: 11    triamcinolone 0.1 % External Cream, Apply 1 Application topically 2 (two) times daily as needed. , Disp: 60 g, Rfl: 0    DEXCOM G6  Does not apply Device, 1 Device by Does not apply route daily. , Disp: 1 each, Rfl: 0    Continuous Blood Gluc Sensor (DEXCOM G6 SENSOR) Does not apply Misc, Change sensor every 10 days, Disp: 3 each, Rfl: 2    Continuous Blood Gluc Transmit (DEXCOM G6 TRANSMITTER) Does not apply Misc, Use as directed with Dexcom G6 sensor, Disp: 1 each, Rfl: 0    insulin glargine 100 UNIT/ML Subcutaneous Solution, Inject 50 Units into the skin every morning., Disp: 50 mL, Rfl: 1    BD PEN NEEDLE MINI U/F 31G X 5 MM Does not apply Misc, , Disp: , Rfl:     omega-3 fatty acids 1000 MG Oral Cap, Take 2,000 mg by mouth 2 (two) times daily with meals. , Disp: , Rfl:     ALLERGIES:   Allergies   Allergen Reactions    Niacin ITCHING       SOCIAL HISTORY:   Social History     Socioeconomic History    Marital status:    Tobacco Use    Smoking status: Every Day    Smokeless tobacco: Never   Vaping Use    Vaping Use: Former   Substance and Sexual Activity    Alcohol use: Never    Drug use: Yes     Types: Cannabis       PAST MEDICAL HISTORY:   Past Medical History:   Diagnosis Date    COVID-19 01/2022    Diabetes ([facility] [address]     Eruptive xanthoma     Essential hypertension     High blood pressure     High cholesterol     Hyperlipidemia     Hypertriglyceridemia     Pancreatitis     Trigeminal neuralgia        PAST SURGICAL HISTORY:   Past Surgical History:   Procedure Laterality Date    OTHER      Testicle Surgery    OTHER SURGICAL HISTORY         PHYSICAL EXAM:   There were no vitals filed for this visit. BMI:   There is no height or weight on file to calculate BMI. PHYSICAL EXAM  General Appearance:  alert, well developed, in no acute distress  Eyes:  normal conjunctivae, sclera. , normal sclera and normal pupils  Throat/Neck: normal sound to voice. Respiratory:  non-labored. no increased work of breathing. Skin:  normal moisture and skin texture  Psychiatric:  oriented to time, self, and place    LABS: Pertinent labs reviewed    ASSESSMENT/PLAN:    -Reviewed with patient the pathogenesis of diabetes, clinical significance of A1c, and common complications such as: microvascular, macrovascular and diabetic ketoacidosis. Patient verbalizes understanding of the importance of glycemic control and the goals of therapy.   -Discussed with patient glucose targets ranges (Fasting  and post prandial <180). 1.Type 2 Diabetes Mellitus, uncontrolled, with hyperglycemia   -LAB DATA  HbA,C: 11.2% on 2023  a) Medications  continue with Metformin 1,000mg twice daily   - start Mounjaro 2.5mg weekly for 4 weeks, then increase Mounjaro 5mg weekly - Risks and benefits reviewed. Verbalizes understanding.  -re-start Lantus 50 units once daily - Risks and benefits reviewed. Verbalizes understanding.  -decrease Humalog  25 TID with meals - Risks and benefits reviewed. Verbalizes understanding.     - reviewed Mounjaro administration technique in clinic today. - reviewed benefits of CGM technology. Patient interested in starting Dexcom G7 if covered by insurance. Rx sent to pharmacy. -reviewed target goal BG readings and A1C  -reviewed when to call and notify me of abnormal BG readings.   -discussed that he give me an update in 3 days and again in 3 weeks. Sooner if he develops BG readings <75. b) Nephropathy: GFR: 129  and urine MA: 131.1 on 2023  c) Discussed importance of annual eye exams and to schedule exam with Dr. Howard Buckner as soon as able. d) Foot exam:  denies issues/concerns to bilateral feet   e) start using Dexcom G6 CGM. Rx sent to pharmacy. f) Life style changes reviewed     2.  Blood Pressure Management   -on lisinopril 5mg once daily     3.mixed hyperlipidemia   -LDL: 55 and Tri on up in 6 months       On 10/18/2024, IGeorgette scribed the services personally performed by Dorian MORALES Sra, MD   The documentation recorded by the scribe accurately and completely reflects the service(s) I personally performed and the decisions made by me.        11/6/2023  - on rosuvastatin 20mg at bedtime and ezetimibe 10mg once daily, fenofibrate 160mg once daily  and niacin 1,000mg       RTC in 3 months   Patient instructed to call sooner if they develop Blood glucose readings <75 and/or if they have readings persistently >200. The risks and benefits of my recommendations, as well as other treatment options were discussed with the patient today. questions were also answered to the best of my knowledge. Patient verbalizes understanding of these issues and agrees to the plan. 12/1/2023  DARYL Cardenas    Please note that the following visit was completed using two-way, real-time interactive audio and/or video communication. There are limitations of this visit as no physical exam could be performed. Every conscious effort was taken to allow for sufficient and adequate time. This billing was spent on reviewing labs, medications, radiology tests and decision making. Appropriate medical decision-making and tests are ordered as detailed in the plan of care above.

## 2025-03-07 DIAGNOSIS — Z79.4 UNCONTROLLED TYPE 2 DIABETES MELLITUS WITH HYPERGLYCEMIA, WITH LONG-TERM CURRENT USE OF INSULIN (HCC): ICD-10-CM

## 2025-03-07 DIAGNOSIS — E11.65 UNCONTROLLED TYPE 2 DIABETES MELLITUS WITH HYPERGLYCEMIA, WITH LONG-TERM CURRENT USE OF INSULIN (HCC): ICD-10-CM

## 2025-03-08 NOTE — TELEPHONE ENCOUNTER
Endocrine refill protocol for rapid acting, regular, intermediate, and mixed insulin:    Protocol Criteria:  PASSED     If all below requirements are met, send a 90-day supply with 1 refill per provider protocol.    Verify appointment with Endocrinology completed in the last 6 months or scheduled in the next 3 months.  Verify A1C has been completed within the last 6 months and is below 8.5%    -May substitute prescriptions for Novolog and Humalog unless documented allergy (pens and vials) at the same dose and concentration per insurance preference and provider protocol.   -May substitute prescriptions for Novolin R and Humulin R unless documented allergy (pens and vials) at the same dose and concentration per insurance preference and provider protocol.   -May substitute prescriptions for Novolin N and Humulin N unless documented allergy (pens and vials) at the same dose and concentration per insurance preference and provider protocol.   -May substitute prescriptions for Humulin and Novolin 70/30 insulin unless documented allergy at the same dose and concentration per insurance preference and provider protocol.    Last completed office visit: 12/30/2024 Martha Gomez APRN   Next scheduled Follow up:   Future Appointments   Date Time Provider Department Center   6/2/2025  8:30 AM Martha Gomez APRN [facility]      Last A1C result: 7% done 12/30/2024.   ---  Endocrine refill protocol for basal insulins     Protocol Criteria: PASSED     If all below requirements are met, send a 90-day supply with 1 refill per provider protocol.       Verify Appointment with Endocrinology completed in the last 6 months or scheduled in the next 3 months.  Verify A1C has been completed within the last 6 months and is below 8.5%     Last completed office visit:12/30/2024 Martha Gomez APRN   Next scheduled Follow up:   Future Appointments   Date Time Provider Department Center   6/2/2025  8:30 AM Martha Gomez APRN [facility]      Last A1c result: Last A1c value was 7% done 12/30/2024.

## 2025-03-10 RX ORDER — INSULIN GLARGINE 100 [IU]/ML
60 INJECTION, SOLUTION SUBCUTANEOUS DAILY
Qty: 54 ML | Refills: 0 | Status: SHIPPED | OUTPATIENT
Start: 2025-03-10

## 2025-03-10 RX ORDER — INSULIN LISPRO 100 [IU]/ML
INJECTION, SOLUTION INTRAVENOUS; SUBCUTANEOUS
Qty: 63 ML | Refills: 0 | Status: SHIPPED | OUTPATIENT
Start: 2025-03-10

## 2025-05-05 ENCOUNTER — LAB ENCOUNTER (OUTPATIENT)
Dept: LAB | Age: 30
End: 2025-05-05
Attending: NURSE PRACTITIONER
Payer: COMMERCIAL

## 2025-05-05 DIAGNOSIS — R79.89 ELEVATED LFTS: ICD-10-CM

## 2025-05-05 LAB
ALBUMIN SERPL-MCNC: 4.6 G/DL (ref 3.2–4.8)
ALP LIVER SERPL-CCNC: 67 U/L (ref 45–117)
ALT SERPL-CCNC: 44 U/L (ref 10–49)
AST SERPL-CCNC: 23 U/L (ref ?–34)
BILIRUB DIRECT SERPL-MCNC: 0.1 MG/DL (ref ?–0.3)
BILIRUB SERPL-MCNC: 0.4 MG/DL (ref 0.3–1.2)
PROT SERPL-MCNC: 7.3 G/DL (ref 5.7–8.2)

## 2025-05-05 PROCEDURE — 80076 HEPATIC FUNCTION PANEL: CPT

## 2025-05-05 PROCEDURE — 36415 COLL VENOUS BLD VENIPUNCTURE: CPT

## 2025-06-02 ENCOUNTER — OFFICE VISIT (OUTPATIENT)
Dept: ENDOCRINOLOGY CLINIC | Facility: CLINIC | Age: 30
End: 2025-06-02

## 2025-06-02 VITALS
HEART RATE: 106 BPM | DIASTOLIC BLOOD PRESSURE: 88 MMHG | SYSTOLIC BLOOD PRESSURE: 131 MMHG | HEIGHT: 67 IN | BODY MASS INDEX: 42.69 KG/M2 | WEIGHT: 272 LBS | RESPIRATION RATE: 18 BRPM

## 2025-06-02 DIAGNOSIS — E78.2 MIXED HYPERLIPIDEMIA: ICD-10-CM

## 2025-06-02 DIAGNOSIS — Z79.4 TYPE 2 DIABETES MELLITUS WITHOUT COMPLICATION, WITH LONG-TERM CURRENT USE OF INSULIN (HCC): Primary | ICD-10-CM

## 2025-06-02 DIAGNOSIS — E11.9 TYPE 2 DIABETES MELLITUS WITHOUT COMPLICATION, WITH LONG-TERM CURRENT USE OF INSULIN (HCC): Primary | ICD-10-CM

## 2025-06-02 LAB
GLUCOSE BLOOD: 167
HEMOGLOBIN A1C: 6.7 % (ref 4.3–5.6)
TEST STRIP LOT #: NORMAL NUMERIC

## 2025-06-02 RX ORDER — MICONAZOLE NITRATE 20 MG/G
1 CREAM TOPICAL 2 TIMES DAILY
Qty: 56 G | Refills: 1 | Status: SHIPPED | OUTPATIENT
Start: 2025-06-02

## 2025-06-02 NOTE — PATIENT INSTRUCTIONS
A1C: 6.7% today --> previously was 7.0% on 12/30/2024  Blood glucose: 167 in clinic today    Medications:   - continue with Metformin 1,000mg twice daily   - continue with Jardiance 25mg once daily in AM   - continue with Mounjaro 12.5mg weekly  - continue with Lantus 60 units daily in AM  - continue with Humalog          24 units with 12pm meal          24 units with 5pm meal          4 units with snacks overnight (no insulin if not eating snacks)        Weight:  Wt Readings from Last 6 Encounters:   06/02/25 272 lb (123.4 kg)   12/30/24 268 lb (121.6 kg)   09/28/24 266 lb 8 oz (120.9 kg)   09/23/24 266 lb 9.6 oz (120.9 kg)   07/16/24 272 lb (123.4 kg)   10/31/23 275 lb 12.8 oz (125.1 kg)     A1C goal:  <7.0% (preferably <6.5%)    Blood sugar testing:  Continue using Freestyle aislinn continuous glucometer     Blood sugar targets:  Before breakfast:   (preferably < 110)  Before meals: <150  2 hours after meals: <180 (preferably <150)     Call for persistent blood sugars < 75 or > 200

## 2025-06-02 NOTE — PROGRESS NOTES
Name: He Woodard  YOB: 1995  Report Period: 05/06/2025 - 06/02/2025 (28 days)  Generated: 06/02/2025  Time CGM Active: 98%      Glucose Statistics and Targets  Average Glucose: 164 mg/dL  Glucose Management Indicator (GMI): 7.2%  Glucose Variability (%CV): 26.2%  Target Range: 70 - 180 mg/dL      Time in Ranges  Very High: >250 mg/dL --- 4%  High: 181 - 250 mg/dL --- 29%  Target Range: 70 - 180 mg/dL --- 67%  Low: 54 - 69 mg/dL --- 0%  Very Low: <54 mg/dL --- 0%

## 2025-06-02 NOTE — PROGRESS NOTES
Name: He Aguilera  Date: 6/2/2025    CHIEF COMPLAINT   Chief Complaint   Patient presents with    Diabetes     Follow-Up, Jardiance side/effect     HISTORY OF PRESENT ILLNESS   He Aguilera is a 30 year old male who presents for follow up on diabetes management.  HbA1C: 6.7% at POC today. Previously was 7.0% on 12/30/2024.   Blood glucose: 167 in clinic today.   Patient notes that he has been feeling well. He has welcomed his daughter (Roula) around 3 months ago.   He continues to follow a low carb diet and staying active per usual. He has been compliant with taking all diabetes medications.     FAMILY HISTORY OF DIABETES  -father, mother and maternal grandfather   DIABETES HISTORY  Diagnosed: 4 years ago - transitioned to [place] insulin 3 years ago   Prior HbA, C or glycohemoglobin were 12.5% 10/26/2021; 11.2% 11/6/2023; 10.4% 7/16/2024; 6.9% 9/23/2024; 7.0% 12/30/2024; 6.7% at POC today;     + Hospitalization with hyperglycemia 4 years ago when diagnosed   + pancreatitis in 5/2021    PREVIOUS MEDICATION FOR DM:  -Victoza-d/c'ed due to high cost  - Farxiga-does not remember taking  - Ozempic -does not remember taking       CURRENT MEDICATIONS FOR DM:  Metformin 1,000mg twice daily   Jardiance 25mg once daily in AM - developed skin rash to penis around 2 weeks ago; used nystatin cream, however has not resolved the rash.   Mounjaro 12.5mg once weekly on sundays- tolerating well; denies GI s/e  Lantus 60 units once daily in AM    Humalog   24 units at 12pm meal   24 units with 5pm meal    4 units with snack overnight     HOME GLUCOSE READINGS:   Works night shift: 12am to 12pm   Meal times are: 12pm, 5pm and 7-8am  Occasionally overnight eating snacks while at work     BG readings:   Report Period: 05/06/2025 - 06/02/2025 (28 days)  Generated: 06/02/2025  Time CGM Active: 98%        Glucose Statistics and Targets  Average Glucose: 164 mg/dL  Glucose Management Indicator (GMI): 7.2%  Glucose Variability (%CV):  26.2%  Target Range: 70 - 180 mg/dL        Time in Ranges  Very High: >250 mg/dL --- 4%  High: 181 - 250 mg/dL --- 29%  Target Range: 70 - 180 mg/dL --- 67%  Low: 54 - 69 mg/dL --- 0%  Very Low: <54 mg/dL --- 0%     Continuous Glucose Monitoring Interpretation    He Aguilera has undergone continuous glucose monitoring with the personal Freestyle aislinn 3 continuous glucose monitor. The blood glucose tracings were evaluated for two weeks prior to office visit. His blood glucose tracings demonstrated overall stable patterns that were mostly within target range. He did not experience any hypoglycemia during the week of evaluation.  As a result of his testing his medications were adjusted per below.     HISTORY OF DIABETES COMPLICATIONS:  History of Retinopathy: denies - last eye exam within the last 12 months: yes - followed by Dr. Troncoso  History of Neuropathy: no   History of Nephropathy: no     ASSOCIATED COMPLICATIONS:   HTN: yes   Hyperlipidemia: yes   Cardiovascular Disease: no   Peripheral Vascular Disease: no     DIETARY COMPLIANCE:  Good; following a low carb diet     EXERCISE:   No- however walking frequently while at work     Polyuria, polyphagia, polydipsia: no   Paresthesias: no   Blurred vision: no   Recent steroids, illness or infections: no     REVIEW OF SYSTEMS  Constitutional: Negative for: weight change, fever, fatigue, cold/heat intolerance  Eyes: Negative for:  Visual changes, proptosis, blurring  ENT: Negative for:  dysphagia, neck swelling, dysphonia  Respiratory: Negative for: hemoptysis, shortness of breath, cough, or dyspnea.  Cardiovascular: Negative for:  chest pain, chest discomfort, palpitations  GI: Negative for:  abdominal pain, nausea, vomiting, diarrhea, heartburn, constipation  Neurology: Negative for: headache, dizziness, syncope, numbness/tingling, or weakness.   Genito-Urinary: Negative for: dysuria, frequency or hematuria   Hematology/Lymphatics: Negative for: bruising, easy  bleeding, lower extremity edema  Skin: Negative for: rash, blister, infection or ulcers.  Endocrine: Negative for: polyuria, polydipsia. No osteoporosis. No thyroid disease.     MEDICATIONS:     Current Outpatient Medications:     Insulin Lispro, 1 Unit Dial, 100 UNIT/ML Subcutaneous Solution Pen-injector, Inject 25 Units into the skin daily with lunch AND 25 Units daily with dinner. May also inject 10 Units with snacks., Disp: 63 mL, Rfl: 0    LANTUS SOLOSTAR 100 UNIT/ML Subcutaneous Solution Pen-injector, INJECT 60 UNITS SUBCUTANEOUSLY ONCE DAILY, Disp: 54 mL, Rfl: 0    Continuous Glucose Sensor (FREESTYLE JONY 3 PLUS SENSOR) Does not apply Misc, 2 each every 30 (thirty) days. Change sensor every 15 days., Disp: 6 each, Rfl: 1    MOUNJARO 12.5 MG/0.5ML Subcutaneous Solution Auto-injector, INJECT 12.5 MG SUBCUTANEOUSLY ONCE A WEEK, Disp: 6 mL, Rfl: 1    JARDIANCE 25 MG Oral Tab, Take 1 tablet by mouth once daily, Disp: 90 tablet, Rfl: 1    Fenofibrate 160 MG Oral Tab, Take 1 tablet (160 mg total) by mouth daily., Disp: 90 tablet, Rfl: 3    ezetimibe 10 MG Oral Tab, Take 1 tablet by mouth once daily, Disp: 90 tablet, Rfl: 3    pantoprazole 40 MG Oral Tab EC, TAKE 1 TABLET BY MOUTH ONCE DAILY IN THE MORNING BEFORE BREAKFAST TO HELP WITH STOMACH ACID AND STOMACH IRRITATION/INFLAMATION., Disp: 90 tablet, Rfl: 3    rosuvastatin 20 MG Oral Tab, TAKE 1 TABLET BY MOUTH NIGHTLY FOR CHOLESTEROL, Disp: 90 tablet, Rfl: 3    metFORMIN HCl 1000 MG Oral Tab, TAKE 1 TABLET BY MOUTH TWICE DAILY WITH MEALS, Disp: 180 tablet, Rfl: 3    lisinopril 5 MG Oral Tab, Take 1 tablet (5 mg total) by mouth daily., Disp: 90 tablet, Rfl: 3    Continuous Glucose Sensor (FREESTYLE JONY 3 SENSOR) Does not apply Misc, 1 each every 14 (fourteen) days., Disp: 6 each, Rfl: 1    Tirzepatide (MOUNJARO) 10 MG/0.5ML Subcutaneous Solution Pen-injector, Inject 10 mg into the skin once a week., Disp: 2 mL, Rfl: 0    nystatin 100,000 Units/g External Cream,  APPLY TOPICALLY TWICE DAILY TO THE RASH FOR 10-14 DAYS, Disp: 30 g, Rfl: 1    ONETOUCH ULTRA In Vitro Strip, Check sugars twice daily and as needed., Disp: 100 each, Rfl: 11    triamcinolone 0.1 % External Cream, Apply 1 Application topically 2 (two) times daily as needed., Disp: 60 g, Rfl: 0    insulin glargine 100 UNIT/ML Subcutaneous Solution, Inject 50 Units into the skin every morning., Disp: 50 mL, Rfl: 1    BD PEN NEEDLE MINI U/F 31G X 5 MM Does not apply Misc, , Disp: , Rfl:     omega-3 fatty acids 1000 MG Oral Cap, Take 2,000 mg by mouth 2 (two) times daily with meals., Disp: , Rfl:     ALLERGIES:   Allergies   Allergen Reactions    Niacin ITCHING       SOCIAL HISTORY:   Social History     Socioeconomic History    Marital status:    Tobacco Use    Smoking status: Former     Current packs/day: 0.00     Types: Cigarettes     Quit date: 2020     Years since quittin.4    Smokeless tobacco: Current   Vaping Use    Vaping status: Every Day    Devices: Disposable   Substance and Sexual Activity    Alcohol use: Never    Drug use: Yes     Types: Cannabis     PAST MEDICAL HISTORY:   Past Medical History:    COVID-19    Diabetes (HCC)    Eruptive xanthoma    Essential hypertension    High blood pressure    High cholesterol    Hyperlipidemia    Hypertriglyceridemia    Pancreatitis (HCC)    Trigeminal neuralgia     PAST SURGICAL HISTORY:   Past Surgical History:   Procedure Laterality Date    Other      Testicle Surgery    Other surgical history       PHYSICAL EXAM:   Vitals:    25 0827   BP: 131/88   BP Location: Right arm   Pulse: 106   Resp: 18   Weight: 272 lb (123.4 kg)   Height: 5' 7\" (1.702 m)     BMI:   Body mass index is 42.6 kg/m².    General Appearance:  alert, well developed, in no acute distress  Nutritional:  no extreme weight gain or loss  Head: Atraumatic  Eyes:  normal conjunctivae, sclera., normal sclera and normal pupils  Throat/Neck: normal sound to voice. Normal hearing, normal  speech  Back: no kyphosis  Respiratory:  Speaking in full sentences, non-labored. no increased work of breathing, no audible wheezing    Skin:  normal moisture and skin texture, no visible lesions  Hair and nails: normal scalp hair  Hematologic:  no excessive bruising  Neuro: motor grossly intact, moving all extremities without difficulty  Psychiatric:  oriented to time, self, and place  Extremities: no obvious extremity swelling, no lesions    LABS: Pertinent labs reviewed    ASSESSMENT/PLAN:    -Reviewed with patient the pathogenesis of diabetes, clinical significance of A1c, and common complications such as: microvascular, macrovascular and diabetic ketoacidosis. Patient verbalizes understanding of the importance of glycemic control and the goals of therapy.   -Discussed with patient glucose targets ranges (Fasting  and post prandial <180).     1.Type 2 Diabetes Mellitus, controlled with long term insulin use   -LAB DATA  HbA,C: 6.7% today   a) Medications  - continue with Metformin 1,000mg twice daily   - continue with Jardiance 25mg once daily in AM   - continue with Mounjaro 12.5mg weekly  - continue with Lantus 60 units daily in AM  - continue with Humalog          25 units with 12pm meal          25 units with 5pm meal          4 units with snacks overnight (no insulin if not eating snacks     - rx for antifungal cream sent to pharmacy. Discussed if he continues to have genital skin rash, will consider stopping jardiance and increase Mounjaro dose further.   - continue to work on eating a low carb diet more consistently    - continue to stay active as currently doing.   -Discussed importance of hydration. Patient advised to drink 1-2 glasses additional of water than usually to avoid dehydration while on Jardiance.   -reviewed target goal BG readings and A1C  -reviewed when to call and notify me of abnormal BG readings.     b) Nephropathy: GFR: 126 and urine MA: 54.5 on 9/23/2024 -->  repeat labs before  next f/u visit   c) UTD with optho - followed by Dr. Troncoso   d) Foot exam: normal on 2024  e)cont. using Freestyle aislinn 3 CGM  f) Life style changes reviewed     2. Blood Pressure Management   -on lisinopril 5mg once daily   - normotensive today     3.mixed hyperlipidemia   -LDL: 88 and Tri on 2024  - on rosuvastatin 20mg at bedtime and ezetimibe 10mg once daily, fenofibrate 160mg once daily  and fish oil 1,000mg   - repeat lipid panel before next follow up visit      RTC in 6 months   Patient instructed to call sooner if they develop Blood glucose readings <75 and/or if they have readings persistently >200.     The risks and benefits of my recommendations were discussed with the patient today. questions were also answered to the best of my knowledge. Patient verbalizes understanding of these issues and agrees to the plan.    2025  DARYL Shah

## 2025-06-27 DIAGNOSIS — E11.9 TYPE 2 DIABETES MELLITUS WITHOUT COMPLICATION, WITH LONG-TERM CURRENT USE OF INSULIN (HCC): ICD-10-CM

## 2025-06-27 DIAGNOSIS — Z79.4 TYPE 2 DIABETES MELLITUS WITHOUT COMPLICATION, WITH LONG-TERM CURRENT USE OF INSULIN (HCC): ICD-10-CM

## 2025-06-27 RX ORDER — TIRZEPATIDE 12.5 MG/.5ML
12.5 INJECTION, SOLUTION SUBCUTANEOUS WEEKLY
Qty: 12 ML | Refills: 1 | Status: SHIPPED | OUTPATIENT
Start: 2025-06-27

## 2025-06-27 NOTE — TELEPHONE ENCOUNTER
Endocrine Refill protocol for oral and injectable diabetic medications    Protocol Criteria:  PASSED  Reason: N/A    If all below requirements are met, send a 90-day supply with 1 refill per provider protocol.    Verify appointment with Endocrinology completed in the last 6 months or scheduled in the next 3 months.  Verify A1C has been completed within the last 6 months and is below 8.5%     Last completed office visit: 6/2/2025 Martha Gomez APRN   Next scheduled Follow up:   Future Appointments   Date Time Provider Department Center   12/1/2025  8:30 AM Martha Gomez APRN [facility]      Last A1c result: Last A1c value was 6.7% done 6/2/2025.   90 day +1 refill pending

## 2025-08-25 RX ORDER — EMPAGLIFLOZIN 25 MG/1
25 TABLET, FILM COATED ORAL DAILY
Qty: 90 TABLET | Refills: 0 | Status: SHIPPED | OUTPATIENT
Start: 2025-08-25

## (undated) DIAGNOSIS — L73.0 ACNE NUCHAE KELOIDALIS: ICD-10-CM

## (undated) DIAGNOSIS — E78.2 MIXED HYPERLIPIDEMIA: ICD-10-CM

## (undated) DIAGNOSIS — N48.1 BALANITIS: ICD-10-CM

## (undated) NOTE — LETTER
Date & Time: 8/26/2022, 6:55 AM  Patient: Binh Restrepo  Encounter Provider(s):    Carmell Romberg, MD       To Whom It May Concern:    Binh Restrepo was seen and treated in our department on 8/26/2022. He can return to work.     If you have any questions or concerns, please do not hesitate to call.        _____________________________  Physician/APC Signature

## (undated) NOTE — LETTER
12/18/2021    Patient: Hailey Alicea    MR Number: [id number]   YOB: 1995   Date of Visit: 12/15/21       Dear Bess Villarreal, DO     Thank you for referring Hailey Alicea for ophthalmology examination.     Hailey Alicea reported a history of typ

## (undated) NOTE — LETTER
8/27/2022              Lea Smith        [place] [id number]         To whom it may concern,    Lea Smith is currently a patient under my medical care. Patient was seen today for medical office visit. He can return back to work on 8/30/2022 without restrictions. If you require additional information please do not hesitate to contact my office.         Sincerely,    Mandy Garcia DO  [facility], [address]  [phone]        Document electronically generated by:  Mandy Garcia DO